# Patient Record
Sex: MALE | Race: WHITE | Employment: OTHER | URBAN - METROPOLITAN AREA
[De-identification: names, ages, dates, MRNs, and addresses within clinical notes are randomized per-mention and may not be internally consistent; named-entity substitution may affect disease eponyms.]

---

## 2017-01-01 ENCOUNTER — APPOINTMENT (OUTPATIENT)
Dept: GENERAL RADIOLOGY | Facility: HOSPITAL | Age: 72
DRG: 190 | End: 2017-01-01
Attending: HOSPITALIST
Payer: MEDICARE

## 2017-01-01 ENCOUNTER — APPOINTMENT (OUTPATIENT)
Dept: CT IMAGING | Facility: HOSPITAL | Age: 72
DRG: 190 | End: 2017-01-01
Attending: HOSPITALIST
Payer: MEDICARE

## 2017-01-01 ENCOUNTER — HOSPITAL ENCOUNTER (INPATIENT)
Facility: HOSPITAL | Age: 72
LOS: 7 days | Discharge: HOSPICE | DRG: 682 | End: 2017-01-01
Attending: EMERGENCY MEDICINE | Admitting: HOSPITALIST
Payer: MEDICARE

## 2017-01-01 ENCOUNTER — APPOINTMENT (OUTPATIENT)
Dept: ULTRASOUND IMAGING | Facility: HOSPITAL | Age: 72
DRG: 682 | End: 2017-01-01
Attending: INTERNAL MEDICINE
Payer: MEDICARE

## 2017-01-01 ENCOUNTER — HOSPITAL ENCOUNTER (INPATIENT)
Facility: HOSPITAL | Age: 72
LOS: 1 days | DRG: 682 | End: 2017-01-01
Attending: HOSPITALIST | Admitting: HOSPITALIST
Payer: OTHER MISCELLANEOUS

## 2017-01-01 ENCOUNTER — TELEPHONE (OUTPATIENT)
Dept: NEUROLOGY | Facility: CLINIC | Age: 72
End: 2017-01-01

## 2017-01-01 ENCOUNTER — HOSPITAL ENCOUNTER (INPATIENT)
Facility: HOSPITAL | Age: 72
LOS: 7 days | Discharge: NURSING, REHAB OR LONG-TERM CARE | DRG: 190 | End: 2017-01-01
Attending: EMERGENCY MEDICINE | Admitting: HOSPITALIST
Payer: MEDICARE

## 2017-01-01 ENCOUNTER — APPOINTMENT (OUTPATIENT)
Dept: GENERAL RADIOLOGY | Facility: HOSPITAL | Age: 72
DRG: 190 | End: 2017-01-01
Attending: EMERGENCY MEDICINE
Payer: MEDICARE

## 2017-01-01 ENCOUNTER — APPOINTMENT (OUTPATIENT)
Dept: GENERAL RADIOLOGY | Facility: HOSPITAL | Age: 72
DRG: 682 | End: 2017-01-01
Attending: EMERGENCY MEDICINE
Payer: MEDICARE

## 2017-01-01 ENCOUNTER — APPOINTMENT (OUTPATIENT)
Dept: GENERAL RADIOLOGY | Facility: HOSPITAL | Age: 72
DRG: 682 | End: 2017-01-01
Attending: HOSPITALIST
Payer: MEDICARE

## 2017-01-01 ENCOUNTER — APPOINTMENT (OUTPATIENT)
Dept: GENERAL RADIOLOGY | Facility: HOSPITAL | Age: 72
DRG: 682 | End: 2017-01-01
Attending: INTERNAL MEDICINE
Payer: MEDICARE

## 2017-01-01 ENCOUNTER — APPOINTMENT (OUTPATIENT)
Dept: GENERAL RADIOLOGY | Facility: HOSPITAL | Age: 72
DRG: 190 | End: 2017-01-01
Attending: ANESTHESIOLOGY
Payer: MEDICARE

## 2017-01-01 ENCOUNTER — APPOINTMENT (OUTPATIENT)
Dept: CV DIAGNOSTICS | Facility: HOSPITAL | Age: 72
DRG: 682 | End: 2017-01-01
Attending: INTERNAL MEDICINE
Payer: MEDICARE

## 2017-01-01 ENCOUNTER — SURGERY (OUTPATIENT)
Age: 72
End: 2017-01-01

## 2017-01-01 ENCOUNTER — APPOINTMENT (OUTPATIENT)
Dept: CV DIAGNOSTICS | Facility: HOSPITAL | Age: 72
DRG: 190 | End: 2017-01-01
Attending: HOSPITALIST
Payer: MEDICARE

## 2017-01-01 ENCOUNTER — APPOINTMENT (OUTPATIENT)
Dept: CT IMAGING | Facility: HOSPITAL | Age: 72
DRG: 682 | End: 2017-01-01
Attending: HOSPITALIST
Payer: MEDICARE

## 2017-01-01 VITALS
TEMPERATURE: 97 F | OXYGEN SATURATION: 94 % | HEART RATE: 70 BPM | BODY MASS INDEX: 42.99 KG/M2 | DIASTOLIC BLOOD PRESSURE: 81 MMHG | RESPIRATION RATE: 20 BRPM | HEIGHT: 68 IN | WEIGHT: 283.63 LBS | SYSTOLIC BLOOD PRESSURE: 151 MMHG

## 2017-01-01 VITALS
HEART RATE: 112 BPM | TEMPERATURE: 103 F | RESPIRATION RATE: 34 BRPM | OXYGEN SATURATION: 90 % | SYSTOLIC BLOOD PRESSURE: 94 MMHG | DIASTOLIC BLOOD PRESSURE: 61 MMHG

## 2017-01-01 VITALS
TEMPERATURE: 98 F | OXYGEN SATURATION: 78 % | HEART RATE: 103 BPM | BODY MASS INDEX: 42.13 KG/M2 | RESPIRATION RATE: 32 BRPM | WEIGHT: 278 LBS | SYSTOLIC BLOOD PRESSURE: 132 MMHG | DIASTOLIC BLOOD PRESSURE: 62 MMHG | HEIGHT: 68 IN

## 2017-01-01 DIAGNOSIS — J96.21 ACUTE ON CHRONIC RESPIRATORY FAILURE WITH HYPOXIA (HCC): ICD-10-CM

## 2017-01-01 DIAGNOSIS — J44.1 COPD EXACERBATION (HCC): ICD-10-CM

## 2017-01-01 DIAGNOSIS — R27.0 ATAXIA: ICD-10-CM

## 2017-01-01 DIAGNOSIS — J44.1 COPD EXACERBATION (HCC): Primary | ICD-10-CM

## 2017-01-01 DIAGNOSIS — R60.0 BILATERAL EDEMA OF LOWER EXTREMITY: ICD-10-CM

## 2017-01-01 DIAGNOSIS — R09.02 HYPOXIA: ICD-10-CM

## 2017-01-01 DIAGNOSIS — I50.9 ACUTE ON CHRONIC CONGESTIVE HEART FAILURE, UNSPECIFIED CONGESTIVE HEART FAILURE TYPE: Primary | ICD-10-CM

## 2017-01-01 LAB
ALBUMIN SERPL BCP-MCNC: 2.9 G/DL (ref 3.5–4.8)
ALBUMIN SERPL ELPH-MCNC: 3.71 G/DL (ref 3.8–5.8)
ALBUMIN/GLOB SERPL: 0.8 {RATIO} (ref 1–2)
ALBUMIN/GLOB SERPL: 1.16 {RATIO} (ref 1–2)
ALP SERPL-CCNC: 59 U/L (ref 32–100)
ALPHA1 GLOB SERPL ELPH-MCNC: 0.22 G/DL (ref 0.1–0.3)
ALPHA2 GLOB SERPL ELPH-MCNC: 0.92 G/DL (ref 0.6–1)
ALT SERPL-CCNC: 24 U/L (ref 17–63)
AMMONIA PLAS-MCNC: 153 UG/DL (ref 28.2–80.4)
AMMONIA PLAS-MCNC: 47 UG/DL (ref 28.2–80.4)
ANION GAP SERPL CALC-SCNC: 6 MMOL/L (ref 0–18)
ANION GAP SERPL CALC-SCNC: 6 MMOL/L (ref 0–18)
ANION GAP SERPL CALC-SCNC: 7 MMOL/L (ref 0–18)
ANION GAP SERPL CALC-SCNC: 8 MMOL/L (ref 0–18)
ANTIBODY SCREEN: NEGATIVE
AST SERPL-CCNC: 36 U/L (ref 15–41)
B-GLOBULIN SERPL ELPH-MCNC: 1.04 G/DL (ref 0.7–1.3)
BACTERIA UR QL AUTO: NEGATIVE /HPF
BACTERIA UR QL AUTO: NEGATIVE /HPF
BASOPHILS # BLD: 0 K/UL (ref 0–0.2)
BASOPHILS NFR BLD: 0 %
BILIRUB SERPL-MCNC: 0.7 MG/DL (ref 0.3–1.2)
BILIRUB UR QL: NEGATIVE
BILIRUB UR QL: NEGATIVE
BLOOD TYPE BARCODE: 1700
BNP SERPL-MCNC: 43 PG/ML (ref 0–100)
BUN SERPL-MCNC: 23 MG/DL (ref 8–20)
BUN SERPL-MCNC: 25 MG/DL (ref 8–20)
BUN SERPL-MCNC: 25 MG/DL (ref 8–20)
BUN SERPL-MCNC: 26 MG/DL (ref 8–20)
BUN SERPL-MCNC: 27 MG/DL (ref 8–20)
BUN SERPL-MCNC: 28 MG/DL (ref 8–20)
BUN SERPL-MCNC: 31 MG/DL (ref 8–20)
BUN/CREAT SERPL: 22.3 (ref 10–20)
BUN/CREAT SERPL: 26.3 (ref 10–20)
BUN/CREAT SERPL: 28.1 (ref 10–20)
BUN/CREAT SERPL: 29.3 (ref 10–20)
BUN/CREAT SERPL: 31.1 (ref 10–20)
BUN/CREAT SERPL: 38.2 (ref 10–20)
BUN/CREAT SERPL: 41.9 (ref 10–20)
CALCIUM SERPL-MCNC: 8.8 MG/DL (ref 8.5–10.5)
CALCIUM SERPL-MCNC: 9 MG/DL (ref 8.5–10.5)
CALCIUM SERPL-MCNC: 9.1 MG/DL (ref 8.5–10.5)
CALCIUM SERPL-MCNC: 9.1 MG/DL (ref 8.5–10.5)
CALCIUM SERPL-MCNC: 9.2 MG/DL (ref 8.5–10.5)
CALCIUM SERPL-MCNC: 9.3 MG/DL (ref 8.5–10.5)
CALCIUM SERPL-MCNC: 9.8 MG/DL (ref 8.5–10.5)
CHLORIDE SERPL-SCNC: 100 MMOL/L (ref 95–110)
CHLORIDE SERPL-SCNC: 103 MMOL/L (ref 95–110)
CHLORIDE SERPL-SCNC: 103 MMOL/L (ref 95–110)
CHLORIDE SERPL-SCNC: 97 MMOL/L (ref 95–110)
CHLORIDE SERPL-SCNC: 98 MMOL/L (ref 95–110)
CHLORIDE SERPL-SCNC: 99 MMOL/L (ref 95–110)
CHLORIDE SERPL-SCNC: 99 MMOL/L (ref 95–110)
CLARITY UR: CLEAR
CLARITY UR: CLEAR
CO2 SERPL-SCNC: 34 MMOL/L (ref 22–32)
CO2 SERPL-SCNC: 35 MMOL/L (ref 22–32)
CO2 SERPL-SCNC: 36 MMOL/L (ref 22–32)
CO2 SERPL-SCNC: 38 MMOL/L (ref 22–32)
CO2 SERPL-SCNC: 39 MMOL/L (ref 22–32)
COLOR UR: YELLOW
COLOR UR: YELLOW
CREAT SERPL-MCNC: 0.68 MG/DL (ref 0.5–1.5)
CREAT SERPL-MCNC: 0.74 MG/DL (ref 0.5–1.5)
CREAT SERPL-MCNC: 0.89 MG/DL (ref 0.5–1.5)
CREAT SERPL-MCNC: 0.9 MG/DL (ref 0.5–1.5)
CREAT SERPL-MCNC: 0.92 MG/DL (ref 0.5–1.5)
CREAT SERPL-MCNC: 0.95 MG/DL (ref 0.5–1.5)
CREAT SERPL-MCNC: 1.03 MG/DL (ref 0.5–1.5)
EOSINOPHIL # BLD: 0 K/UL (ref 0–0.7)
EOSINOPHIL # BLD: 0.1 K/UL (ref 0–0.7)
EOSINOPHIL NFR BLD: 0 %
EOSINOPHIL NFR BLD: 1 %
ERYTHROCYTE [DISTWIDTH] IN BLOOD BY AUTOMATED COUNT: 15 % (ref 11–15)
ERYTHROCYTE [DISTWIDTH] IN BLOOD BY AUTOMATED COUNT: 15 % (ref 11–15)
ERYTHROCYTE [DISTWIDTH] IN BLOOD BY AUTOMATED COUNT: 15.1 % (ref 11–15)
ERYTHROCYTE [DISTWIDTH] IN BLOOD BY AUTOMATED COUNT: 15.4 % (ref 11–15)
ERYTHROCYTE [DISTWIDTH] IN BLOOD BY AUTOMATED COUNT: 15.5 % (ref 11–15)
ERYTHROCYTE [DISTWIDTH] IN BLOOD BY AUTOMATED COUNT: 15.5 % (ref 11–15)
ERYTHROCYTE [DISTWIDTH] IN BLOOD BY AUTOMATED COUNT: 15.9 % (ref 11–15)
ERYTHROCYTE [SEDIMENTATION RATE] IN BLOOD: 6 MM/HR (ref 0–20)
GAMMA GLOB SERPL ELPH-MCNC: 1.02 G/DL (ref 0.5–1.7)
GLOBULIN PLAS-MCNC: 3.5 G/DL (ref 2.5–3.7)
GLUCOSE SERPL-MCNC: 106 MG/DL (ref 70–99)
GLUCOSE SERPL-MCNC: 112 MG/DL (ref 70–99)
GLUCOSE SERPL-MCNC: 121 MG/DL (ref 70–99)
GLUCOSE SERPL-MCNC: 126 MG/DL (ref 70–99)
GLUCOSE SERPL-MCNC: 128 MG/DL (ref 70–99)
GLUCOSE SERPL-MCNC: 91 MG/DL (ref 70–99)
GLUCOSE SERPL-MCNC: 99 MG/DL (ref 70–99)
GLUCOSE UR-MCNC: NEGATIVE MG/DL
GLUCOSE UR-MCNC: NEGATIVE MG/DL
HCT VFR BLD AUTO: 47.9 % (ref 41–52)
HCT VFR BLD AUTO: 48 % (ref 41–52)
HCT VFR BLD AUTO: 48.4 % (ref 41–52)
HCT VFR BLD AUTO: 48.8 % (ref 41–52)
HCT VFR BLD AUTO: 50.4 % (ref 41–52)
HCT VFR BLD AUTO: 51 % (ref 41–52)
HCT VFR BLD AUTO: 52.9 % (ref 41–52)
HGB BLD-MCNC: 15.5 G/DL (ref 13.5–17.5)
HGB BLD-MCNC: 15.8 G/DL (ref 13.5–17.5)
HGB BLD-MCNC: 15.9 G/DL (ref 13.5–17.5)
HGB BLD-MCNC: 16 G/DL (ref 13.5–17.5)
HGB BLD-MCNC: 16.2 G/DL (ref 13.5–17.5)
HGB BLD-MCNC: 16.6 G/DL (ref 13.5–17.5)
HGB BLD-MCNC: 17.2 G/DL (ref 13.5–17.5)
HGB UR QL STRIP.AUTO: NEGATIVE
HGB UR QL STRIP.AUTO: NEGATIVE
LEUKOCYTE ESTERASE UR QL STRIP.AUTO: NEGATIVE
LEUKOCYTE ESTERASE UR QL STRIP.AUTO: NEGATIVE
LYMPHOCYTES # BLD: 0.8 K/UL (ref 1–4)
LYMPHOCYTES # BLD: 0.9 K/UL (ref 1–4)
LYMPHOCYTES # BLD: 1.4 K/UL (ref 1–4)
LYMPHOCYTES # BLD: 1.7 K/UL (ref 1–4)
LYMPHOCYTES # BLD: 1.7 K/UL (ref 1–4)
LYMPHOCYTES # BLD: 2.6 K/UL (ref 1–4)
LYMPHOCYTES # BLD: 2.8 K/UL (ref 1–4)
LYMPHOCYTES NFR BLD: 11 %
LYMPHOCYTES NFR BLD: 23 %
LYMPHOCYTES NFR BLD: 23 %
LYMPHOCYTES NFR BLD: 27 %
LYMPHOCYTES NFR BLD: 33 %
LYMPHOCYTES NFR BLD: 34 %
LYMPHOCYTES NFR BLD: 8 %
MAGNESIUM SERPL-MCNC: 1.6 MG/DL (ref 1.8–2.5)
MAGNESIUM SERPL-MCNC: 1.8 MG/DL (ref 1.8–2.5)
MAGNESIUM SERPL-MCNC: 1.9 MG/DL (ref 1.8–2.5)
MCH RBC QN AUTO: 28 PG (ref 27–32)
MCH RBC QN AUTO: 28.1 PG (ref 27–32)
MCH RBC QN AUTO: 28.2 PG (ref 27–32)
MCH RBC QN AUTO: 28.2 PG (ref 27–32)
MCH RBC QN AUTO: 28.3 PG (ref 27–32)
MCH RBC QN AUTO: 28.5 PG (ref 27–32)
MCH RBC QN AUTO: 28.6 PG (ref 27–32)
MCHC RBC AUTO-ENTMCNC: 32.2 G/DL (ref 32–37)
MCHC RBC AUTO-ENTMCNC: 32.3 G/DL (ref 32–37)
MCHC RBC AUTO-ENTMCNC: 32.5 G/DL (ref 32–37)
MCHC RBC AUTO-ENTMCNC: 32.5 G/DL (ref 32–37)
MCHC RBC AUTO-ENTMCNC: 32.6 G/DL (ref 32–37)
MCHC RBC AUTO-ENTMCNC: 32.8 G/DL (ref 32–37)
MCHC RBC AUTO-ENTMCNC: 33.1 G/DL (ref 32–37)
MCV RBC AUTO: 86.1 FL (ref 80–100)
MCV RBC AUTO: 86.4 FL (ref 80–100)
MCV RBC AUTO: 86.8 FL (ref 80–100)
MCV RBC AUTO: 87 FL (ref 80–100)
MCV RBC AUTO: 87 FL (ref 80–100)
MCV RBC AUTO: 87.2 FL (ref 80–100)
MCV RBC AUTO: 87.2 FL (ref 80–100)
METAMYELOCYTES # BLD MANUAL: 0.06 K/UL
METAMYELOCYTES # BLD MANUAL: 0.06 K/UL
METAMYELOCYTES NFR BLD: 1 %
MONOCYTES # BLD: 0.6 K/UL (ref 0–1)
MONOCYTES # BLD: 0.7 K/UL (ref 0–1)
MONOCYTES # BLD: 0.9 K/UL (ref 0–1)
MONOCYTES # BLD: 1.2 K/UL (ref 0–1)
MONOCYTES # BLD: 1.3 K/UL (ref 0–1)
MONOCYTES NFR BLD: 11 %
MONOCYTES NFR BLD: 12 %
MONOCYTES NFR BLD: 15 %
MONOCYTES NFR BLD: 19 %
MONOCYTES NFR BLD: 6 %
MONOCYTES NFR BLD: 8 %
MONOCYTES NFR BLD: 9 %
MYELOCYTES NFR BLD: 1 %
NEUTROPHILS # BLD AUTO: 3.3 K/UL (ref 1.8–7.7)
NEUTROPHILS # BLD AUTO: 3.8 K/UL (ref 1.8–7.7)
NEUTROPHILS # BLD AUTO: 4.4 K/UL (ref 1.8–7.7)
NEUTROPHILS # BLD AUTO: 4.8 K/UL (ref 1.8–7.7)
NEUTROPHILS # BLD AUTO: 5 K/UL (ref 1.8–7.7)
NEUTROPHILS # BLD AUTO: 6.1 K/UL (ref 1.8–7.7)
NEUTROPHILS # BLD AUTO: 8.1 K/UL (ref 1.8–7.7)
NEUTROPHILS NFR BLD: 51 %
NEUTROPHILS NFR BLD: 54 %
NEUTROPHILS NFR BLD: 58 %
NEUTROPHILS NFR BLD: 61 %
NEUTROPHILS NFR BLD: 68 %
NEUTROPHILS NFR BLD: 74 %
NEUTROPHILS NFR BLD: 83 %
NEUTS BAND NFR BLD: 1 %
NEUTS BAND NFR BLD: 3 %
NEUTS BAND NFR BLD: 4 %
NITRITE UR QL STRIP.AUTO: NEGATIVE
NITRITE UR QL STRIP.AUTO: NEGATIVE
OSMOLALITY UR CALC.SUM OF ELEC: 299 MOSM/KG (ref 275–295)
OSMOLALITY UR CALC.SUM OF ELEC: 300 MOSM/KG (ref 275–295)
OSMOLALITY UR CALC.SUM OF ELEC: 301 MOSM/KG (ref 275–295)
OSMOLALITY UR CALC.SUM OF ELEC: 303 MOSM/KG (ref 275–295)
OSMOLALITY UR CALC.SUM OF ELEC: 303 MOSM/KG (ref 275–295)
OSMOLALITY UR CALC.SUM OF ELEC: 304 MOSM/KG (ref 275–295)
OSMOLALITY UR CALC.SUM OF ELEC: 308 MOSM/KG (ref 275–295)
PH UR: 5 [PH] (ref 5–8)
PH UR: 5 [PH] (ref 5–8)
PLATELET # BLD AUTO: 103 K/UL (ref 140–400)
PLATELET # BLD AUTO: 107 K/UL (ref 140–400)
PLATELET # BLD AUTO: 111 K/UL (ref 140–400)
PLATELET # BLD AUTO: 117 K/UL (ref 140–400)
PLATELET # BLD AUTO: 117 K/UL (ref 140–400)
PLATELET # BLD AUTO: 82 K/UL (ref 140–400)
PLATELET # BLD AUTO: 95 K/UL (ref 140–400)
PMV BLD AUTO: 8.8 FL (ref 7.4–10.3)
PMV BLD AUTO: 9 FL (ref 7.4–10.3)
PMV BLD AUTO: 9.2 FL (ref 7.4–10.3)
PMV BLD AUTO: 9.2 FL (ref 7.4–10.3)
PMV BLD AUTO: 9.4 FL (ref 7.4–10.3)
PMV BLD AUTO: 9.5 FL (ref 7.4–10.3)
PMV BLD AUTO: 9.6 FL (ref 7.4–10.3)
POTASSIUM SERPL-SCNC: 3.7 MMOL/L (ref 3.3–5.1)
POTASSIUM SERPL-SCNC: 4 MMOL/L (ref 3.3–5.1)
POTASSIUM SERPL-SCNC: 4.3 MMOL/L (ref 3.3–5.1)
POTASSIUM SERPL-SCNC: 4.4 MMOL/L (ref 3.3–5.1)
POTASSIUM SERPL-SCNC: 4.4 MMOL/L (ref 3.3–5.1)
POTASSIUM SERPL-SCNC: 4.5 MMOL/L (ref 3.3–5.1)
POTASSIUM SERPL-SCNC: 4.7 MMOL/L (ref 3.3–5.1)
POTASSIUM SERPL-SCNC: 5 MMOL/L (ref 3.3–5.1)
PROT SERPL-MCNC: 6.4 G/DL (ref 5.9–8.4)
PROT UR-MCNC: NEGATIVE MG/DL
PROT UR-MCNC: NEGATIVE MG/DL
RBC # BLD AUTO: 5.5 M/UL (ref 4.5–5.9)
RBC # BLD AUTO: 5.57 M/UL (ref 4.5–5.9)
RBC # BLD AUTO: 5.59 M/UL (ref 4.5–5.9)
RBC # BLD AUTO: 5.6 M/UL (ref 4.5–5.9)
RBC # BLD AUTO: 5.8 M/UL (ref 4.5–5.9)
RBC # BLD AUTO: 5.87 M/UL (ref 4.5–5.9)
RBC # BLD AUTO: 6.06 M/UL (ref 4.5–5.9)
RBC #/AREA URNS AUTO: 1 /HPF
RBC #/AREA URNS AUTO: <1 /HPF
RH BLOOD TYPE: NEGATIVE
SODIUM SERPL-SCNC: 141 MMOL/L (ref 136–144)
SODIUM SERPL-SCNC: 143 MMOL/L (ref 136–144)
SODIUM SERPL-SCNC: 144 MMOL/L (ref 136–144)
SODIUM SERPL-SCNC: 147 MMOL/L (ref 136–144)
SP GR UR STRIP: 1.02 (ref 1–1.03)
SP GR UR STRIP: 1.02 (ref 1–1.03)
TOTAL PROTEIN (SPECIAL TESTING): 6.9 G/DL (ref 6.5–9.1)
TROPONIN I SERPL-MCNC: 0.01 NG/ML (ref ?–0.03)
TSH SERPL-ACNC: 0.51 UIU/ML (ref 0.34–5.6)
UROBILINOGEN UR STRIP-ACNC: <2
UROBILINOGEN UR STRIP-ACNC: <2
VALPROATE SERPL-MCNC: 68 MCG/ML (ref 50–100)
VIT B12 SERPL-MCNC: 517 PG/ML (ref 181–914)
VIT C UR-MCNC: 40 MG/DL
VIT C UR-MCNC: 40 MG/DL
WBC # BLD AUTO: 6.1 K/UL (ref 4–11)
WBC # BLD AUTO: 6.2 K/UL (ref 4–11)
WBC # BLD AUTO: 7.4 K/UL (ref 4–11)
WBC # BLD AUTO: 7.9 K/UL (ref 4–11)
WBC # BLD AUTO: 8.3 K/UL (ref 4–11)
WBC # BLD AUTO: 8.3 K/UL (ref 4–11)
WBC # BLD AUTO: 9.4 K/UL (ref 4–11)
WBC #/AREA URNS AUTO: 1 /HPF
WBC #/AREA URNS AUTO: 1 /HPF

## 2017-01-01 PROCEDURE — 99233 SBSQ HOSP IP/OBS HIGH 50: CPT | Performed by: HOSPITALIST

## 2017-01-01 PROCEDURE — 99238 HOSP IP/OBS DSCHRG MGMT 30/<: CPT | Performed by: HOSPITALIST

## 2017-01-01 PROCEDURE — 99232 SBSQ HOSP IP/OBS MODERATE 35: CPT | Performed by: INTERNAL MEDICINE

## 2017-01-01 PROCEDURE — 94640 AIRWAY INHALATION TREATMENT: CPT

## 2017-01-01 PROCEDURE — 71010 XR CHEST AP PORTABLE  (CPT=71010): CPT

## 2017-01-01 PROCEDURE — 99233 SBSQ HOSP IP/OBS HIGH 50: CPT | Performed by: INTERNAL MEDICINE

## 2017-01-01 PROCEDURE — 93306 TTE W/DOPPLER COMPLETE: CPT

## 2017-01-01 PROCEDURE — 99233 SBSQ HOSP IP/OBS HIGH 50: CPT | Performed by: REGISTERED NURSE

## 2017-01-01 PROCEDURE — 99232 SBSQ HOSP IP/OBS MODERATE 35: CPT | Performed by: OTHER

## 2017-01-01 PROCEDURE — 70450 CT HEAD/BRAIN W/O DYE: CPT

## 2017-01-01 PROCEDURE — 99233 SBSQ HOSP IP/OBS HIGH 50: CPT | Performed by: OTHER

## 2017-01-01 PROCEDURE — 71250 CT THORAX DX C-: CPT

## 2017-01-01 PROCEDURE — 3E0S33Z INTRODUCTION OF ANTI-INFLAMMATORY INTO EPIDURAL SPACE, PERCUTANEOUS APPROACH: ICD-10-PCS | Performed by: ANESTHESIOLOGY

## 2017-01-01 PROCEDURE — 99223 1ST HOSP IP/OBS HIGH 75: CPT | Performed by: INTERNAL MEDICINE

## 2017-01-01 PROCEDURE — 99239 HOSP IP/OBS DSCHRG MGMT >30: CPT | Performed by: HOSPITALIST

## 2017-01-01 PROCEDURE — 77003 FLUOROGUIDE FOR SPINE INJECT: CPT

## 2017-01-01 PROCEDURE — 93306 TTE W/DOPPLER COMPLETE: CPT | Performed by: INTERNAL MEDICINE

## 2017-01-01 PROCEDURE — 99223 1ST HOSP IP/OBS HIGH 75: CPT | Performed by: HOSPITALIST

## 2017-01-01 PROCEDURE — 93970 EXTREMITY STUDY: CPT

## 2017-01-01 PROCEDURE — 76775 US EXAM ABDO BACK WALL LIM: CPT

## 2017-01-01 PROCEDURE — 72131 CT LUMBAR SPINE W/O DYE: CPT

## 2017-01-01 PROCEDURE — 99223 1ST HOSP IP/OBS HIGH 75: CPT | Performed by: REGISTERED NURSE

## 2017-01-01 PROCEDURE — 99232 SBSQ HOSP IP/OBS MODERATE 35: CPT | Performed by: REGISTERED NURSE

## 2017-01-01 PROCEDURE — 99232 SBSQ HOSP IP/OBS MODERATE 35: CPT | Performed by: HOSPITALIST

## 2017-01-01 PROCEDURE — 71010 XR CHEST AP/PA (1 VIEW) (CPT=71010): CPT

## 2017-01-01 PROCEDURE — 90792 PSYCH DIAG EVAL W/MED SRVCS: CPT | Performed by: OTHER

## 2017-01-01 RX ORDER — ZOLPIDEM TARTRATE 5 MG/1
2.5 TABLET ORAL NIGHTLY PRN
Status: CANCELLED | OUTPATIENT
Start: 2017-01-01

## 2017-01-01 RX ORDER — ATROPINE SULFATE 10 MG/ML
2 SOLUTION/ DROPS OPHTHALMIC EVERY 2 HOUR PRN
Status: CANCELLED | OUTPATIENT
Start: 2017-01-01

## 2017-01-01 RX ORDER — DIVALPROEX SODIUM 500 MG/1
1500 TABLET, EXTENDED RELEASE ORAL
Status: DISCONTINUED | OUTPATIENT
Start: 2017-01-01 | End: 2017-01-01

## 2017-01-01 RX ORDER — METHYLPREDNISOLONE ACETATE 80 MG/ML
INJECTION, SUSPENSION INTRA-ARTICULAR; INTRALESIONAL; INTRAMUSCULAR; SOFT TISSUE AS NEEDED
Status: DISCONTINUED | OUTPATIENT
Start: 2017-01-01 | End: 2017-01-01 | Stop reason: HOSPADM

## 2017-01-01 RX ORDER — DOCUSATE SODIUM 100 MG/1
100 CAPSULE, LIQUID FILLED ORAL 2 TIMES DAILY
Status: ON HOLD | COMMUNITY
End: 2017-01-01

## 2017-01-01 RX ORDER — MORPHINE SULFATE IN 0.9 % NACL 1 MG/ML
1 PLASTIC BAG, INJECTION (ML) INTRAVENOUS CONTINUOUS PRN
Status: DISCONTINUED | OUTPATIENT
Start: 2017-01-01 | End: 2017-01-01

## 2017-01-01 RX ORDER — ONDANSETRON 2 MG/ML
4 INJECTION INTRAMUSCULAR; INTRAVENOUS EVERY 6 HOURS PRN
Status: CANCELLED | OUTPATIENT
Start: 2017-01-01

## 2017-01-01 RX ORDER — SCOLOPAMINE TRANSDERMAL SYSTEM 1 MG/1
1 PATCH, EXTENDED RELEASE TRANSDERMAL
Status: CANCELLED | OUTPATIENT
Start: 2017-04-06

## 2017-01-01 RX ORDER — SENNA AND DOCUSATE SODIUM 50; 8.6 MG/1; MG/1
2 TABLET, FILM COATED ORAL
Status: DISCONTINUED | OUTPATIENT
Start: 2017-01-01 | End: 2017-01-01

## 2017-01-01 RX ORDER — DIVALPROEX SODIUM 250 MG/1
500 TABLET, DELAYED RELEASE ORAL DAILY
Status: DISCONTINUED | OUTPATIENT
Start: 2017-01-01 | End: 2017-01-01 | Stop reason: CLARIF

## 2017-01-01 RX ORDER — GLYCOPYRROLATE 0.2 MG/ML
0.4 INJECTION, SOLUTION INTRAMUSCULAR; INTRAVENOUS
Status: DISCONTINUED | OUTPATIENT
Start: 2017-01-01 | End: 2017-01-01

## 2017-01-01 RX ORDER — 0.9 % SODIUM CHLORIDE 0.9 %
VIAL (ML) INJECTION
Status: COMPLETED
Start: 2017-01-01 | End: 2017-01-01

## 2017-01-01 RX ORDER — ACETAMINOPHEN 160 MG/5ML
500 SOLUTION ORAL EVERY 6 HOURS PRN
Status: ON HOLD | COMMUNITY
End: 2017-01-01

## 2017-01-01 RX ORDER — HYDROCODONE BITARTRATE AND ACETAMINOPHEN 5; 325 MG/1; MG/1
2 TABLET ORAL EVERY 4 HOURS PRN
Status: DISCONTINUED | OUTPATIENT
Start: 2017-01-01 | End: 2017-01-01

## 2017-01-01 RX ORDER — DIVALPROEX SODIUM 250 MG/1
500 TABLET, DELAYED RELEASE ORAL NIGHTLY
Status: DISCONTINUED | OUTPATIENT
Start: 2017-01-01 | End: 2017-01-01

## 2017-01-01 RX ORDER — DIVALPROEX SODIUM 500 MG/1
500 TABLET, DELAYED RELEASE ORAL NIGHTLY
Status: ON HOLD | COMMUNITY
End: 2017-01-01

## 2017-01-01 RX ORDER — SODIUM CHLORIDE 0.9 % (FLUSH) 0.9 %
SYRINGE (ML) INJECTION
Status: COMPLETED
Start: 2017-01-01 | End: 2017-01-01

## 2017-01-01 RX ORDER — DOXEPIN HYDROCHLORIDE 50 MG/1
1 CAPSULE ORAL DAILY
Status: DISCONTINUED | OUTPATIENT
Start: 2017-01-01 | End: 2017-01-01

## 2017-01-01 RX ORDER — BISACODYL 10 MG
10 SUPPOSITORY, RECTAL RECTAL
Status: CANCELLED | OUTPATIENT
Start: 2017-01-01

## 2017-01-01 RX ORDER — POLYETHYLENE GLYCOL 3350 17 G/17G
17 POWDER, FOR SOLUTION ORAL DAILY PRN
Status: DISCONTINUED | OUTPATIENT
Start: 2017-01-01 | End: 2017-01-01

## 2017-01-01 RX ORDER — CLONAZEPAM 1 MG/1
2 TABLET ORAL 2 TIMES DAILY
Status: DISCONTINUED | OUTPATIENT
Start: 2017-01-01 | End: 2017-01-01

## 2017-01-01 RX ORDER — BISACODYL 10 MG
10 SUPPOSITORY, RECTAL RECTAL
Status: DISCONTINUED | OUTPATIENT
Start: 2017-01-01 | End: 2017-01-01

## 2017-01-01 RX ORDER — CASTOR OIL AND BALSAM, PERU 788; 87 MG/G; MG/G
OINTMENT TOPICAL 2 TIMES DAILY PRN
Status: CANCELLED | OUTPATIENT
Start: 2017-01-01

## 2017-01-01 RX ORDER — CLOTRIMAZOLE AND BETAMETHASONE DIPROPIONATE 10; .64 MG/G; MG/G
1 CREAM TOPICAL AS NEEDED
Status: DISCONTINUED | OUTPATIENT
Start: 2017-01-01 | End: 2017-01-01

## 2017-01-01 RX ORDER — LIDOCAINE HYDROCHLORIDE 10 MG/ML
INJECTION, SOLUTION EPIDURAL; INFILTRATION; INTRACAUDAL; PERINEURAL AS NEEDED
Status: DISCONTINUED | OUTPATIENT
Start: 2017-01-01 | End: 2017-01-01 | Stop reason: HOSPADM

## 2017-01-01 RX ORDER — CLONAZEPAM 1 MG/1
1 TABLET ORAL 3 TIMES DAILY
Status: DISCONTINUED | OUTPATIENT
Start: 2017-01-01 | End: 2017-01-01

## 2017-01-01 RX ORDER — FINASTERIDE 5 MG/1
5 TABLET, FILM COATED ORAL DAILY
Status: DISCONTINUED | OUTPATIENT
Start: 2017-01-01 | End: 2017-01-01

## 2017-01-01 RX ORDER — MORPHINE SULFATE 2 MG/ML
2 INJECTION, SOLUTION INTRAMUSCULAR; INTRAVENOUS EVERY 4 HOURS PRN
Status: DISCONTINUED | OUTPATIENT
Start: 2017-01-01 | End: 2017-01-01

## 2017-01-01 RX ORDER — DIVALPROEX SODIUM 250 MG/1
250 TABLET, DELAYED RELEASE ORAL NIGHTLY
Status: DISCONTINUED | OUTPATIENT
Start: 2017-01-01 | End: 2017-01-01

## 2017-01-01 RX ORDER — POTASSIUM CHLORIDE 750 MG/1
10 TABLET, EXTENDED RELEASE ORAL DAILY
Status: ON HOLD | COMMUNITY
End: 2017-01-01

## 2017-01-01 RX ORDER — DOCUSATE SODIUM 100 MG/1
100 CAPSULE, LIQUID FILLED ORAL 2 TIMES DAILY
Status: DISCONTINUED | OUTPATIENT
Start: 2017-01-01 | End: 2017-01-01

## 2017-01-01 RX ORDER — MORPHINE SULFATE 2 MG/ML
INJECTION, SOLUTION INTRAMUSCULAR; INTRAVENOUS
Status: COMPLETED
Start: 2017-01-01 | End: 2017-01-01

## 2017-01-01 RX ORDER — FUROSEMIDE 10 MG/ML
40 INJECTION INTRAMUSCULAR; INTRAVENOUS EVERY 8 HOURS PRN
Status: DISCONTINUED | OUTPATIENT
Start: 2017-01-01 | End: 2017-01-01

## 2017-01-01 RX ORDER — SODIUM CHLORIDE 9 MG/ML
INJECTION, SOLUTION INTRAVENOUS CONTINUOUS
Status: DISCONTINUED | OUTPATIENT
Start: 2017-01-01 | End: 2017-01-01

## 2017-01-01 RX ORDER — VALPROIC ACID 250 MG/1
1250 CAPSULE, LIQUID FILLED ORAL NIGHTLY
Status: DISCONTINUED | OUTPATIENT
Start: 2017-01-01 | End: 2017-01-01

## 2017-01-01 RX ORDER — ASPIRIN 81 MG/1
81 TABLET ORAL DAILY
Status: ON HOLD | COMMUNITY
End: 2017-01-01

## 2017-01-01 RX ORDER — LORAZEPAM 2 MG/ML
1 INJECTION INTRAMUSCULAR EVERY 4 HOURS PRN
Status: DISCONTINUED | OUTPATIENT
Start: 2017-01-01 | End: 2017-01-01

## 2017-01-01 RX ORDER — ONDANSETRON 2 MG/ML
4 INJECTION INTRAMUSCULAR; INTRAVENOUS EVERY 4 HOURS PRN
Status: DISCONTINUED | OUTPATIENT
Start: 2017-01-01 | End: 2017-01-01

## 2017-01-01 RX ORDER — METHYLPREDNISOLONE SODIUM SUCCINATE 40 MG/ML
32 INJECTION, POWDER, LYOPHILIZED, FOR SOLUTION INTRAMUSCULAR; INTRAVENOUS EVERY 8 HOURS
Status: DISCONTINUED | OUTPATIENT
Start: 2017-01-01 | End: 2017-01-01

## 2017-01-01 RX ORDER — ATORVASTATIN CALCIUM 10 MG/1
10 TABLET, FILM COATED ORAL NIGHTLY
Status: ON HOLD | COMMUNITY
End: 2017-01-01

## 2017-01-01 RX ORDER — ONDANSETRON 2 MG/ML
4 INJECTION INTRAMUSCULAR; INTRAVENOUS EVERY 4 HOURS PRN
Status: CANCELLED | OUTPATIENT
Start: 2017-01-01

## 2017-01-01 RX ORDER — MORPHINE SULFATE 2 MG/ML
2 INJECTION, SOLUTION INTRAMUSCULAR; INTRAVENOUS ONCE
Status: DISCONTINUED | OUTPATIENT
Start: 2017-01-01 | End: 2017-01-01

## 2017-01-01 RX ORDER — FUROSEMIDE 10 MG/ML
40 INJECTION INTRAMUSCULAR; INTRAVENOUS ONCE
Status: COMPLETED | OUTPATIENT
Start: 2017-01-01 | End: 2017-01-01

## 2017-01-01 RX ORDER — OLANZAPINE 2.5 MG/1
2.5 TABLET ORAL NIGHTLY
Status: DISCONTINUED | OUTPATIENT
Start: 2017-01-01 | End: 2017-01-01

## 2017-01-01 RX ORDER — VALPROIC ACID 250 MG/1
500 CAPSULE, LIQUID FILLED ORAL DAILY
Status: DISCONTINUED | OUTPATIENT
Start: 2017-01-01 | End: 2017-01-01

## 2017-01-01 RX ORDER — OLANZAPINE 20 MG/1
10 TABLET ORAL NIGHTLY
Status: ON HOLD | COMMUNITY
End: 2017-01-01

## 2017-01-01 RX ORDER — ZOLPIDEM TARTRATE 5 MG/1
2.5 TABLET ORAL NIGHTLY PRN
Status: DISCONTINUED | OUTPATIENT
Start: 2017-01-01 | End: 2017-01-01

## 2017-01-01 RX ORDER — HEPARIN SODIUM AND DEXTROSE 10000; 5 [USP'U]/100ML; G/100ML
18 INJECTION INTRAVENOUS ONCE
Status: DISCONTINUED | OUTPATIENT
Start: 2017-01-01 | End: 2017-01-01

## 2017-01-01 RX ORDER — DIVALPROEX SODIUM 500 MG/1
1000 TABLET, EXTENDED RELEASE ORAL NIGHTLY
Qty: 30 TABLET | Refills: 0 | Status: SHIPPED | OUTPATIENT
Start: 2017-01-01 | End: 2017-01-01

## 2017-01-01 RX ORDER — CLONAZEPAM 0.5 MG/1
0.5 TABLET ORAL 3 TIMES DAILY
Status: DISCONTINUED | OUTPATIENT
Start: 2017-01-01 | End: 2017-01-01

## 2017-01-01 RX ORDER — DIVALPROEX SODIUM 500 MG/1
1500 TABLET, EXTENDED RELEASE ORAL NIGHTLY
Status: DISCONTINUED | OUTPATIENT
Start: 2017-01-01 | End: 2017-01-01

## 2017-01-01 RX ORDER — DIVALPROEX SODIUM 500 MG/1
1000 TABLET, EXTENDED RELEASE ORAL NIGHTLY
Status: DISCONTINUED | OUTPATIENT
Start: 2017-01-01 | End: 2017-01-01

## 2017-01-01 RX ORDER — BISACODYL 10 MG
10 SUPPOSITORY, RECTAL RECTAL
Status: ON HOLD | COMMUNITY
End: 2017-01-01

## 2017-01-01 RX ORDER — MORPHINE SULFATE 2 MG/ML
1 INJECTION, SOLUTION INTRAMUSCULAR; INTRAVENOUS EVERY 2 HOUR PRN
Status: DISCONTINUED | OUTPATIENT
Start: 2017-01-01 | End: 2017-01-01

## 2017-01-01 RX ORDER — ONDANSETRON 2 MG/ML
4 INJECTION INTRAMUSCULAR; INTRAVENOUS EVERY 6 HOURS PRN
Status: DISCONTINUED | OUTPATIENT
Start: 2017-01-01 | End: 2017-01-01

## 2017-01-01 RX ORDER — METHYLPREDNISOLONE SODIUM SUCCINATE 40 MG/ML
40 INJECTION, POWDER, LYOPHILIZED, FOR SOLUTION INTRAMUSCULAR; INTRAVENOUS EVERY 8 HOURS
Status: DISCONTINUED | OUTPATIENT
Start: 2017-01-01 | End: 2017-01-01

## 2017-01-01 RX ORDER — SODIUM CHLORIDE 9 MG/ML
INJECTION, SOLUTION INTRAVENOUS ONCE
Status: COMPLETED | OUTPATIENT
Start: 2017-01-01 | End: 2017-01-01

## 2017-01-01 RX ORDER — LACTULOSE 10 G/15ML
20 SOLUTION ORAL DAILY
Status: DISCONTINUED | OUTPATIENT
Start: 2017-01-01 | End: 2017-01-01

## 2017-01-01 RX ORDER — SODIUM CHLORIDE 0.9 % (FLUSH) 0.9 %
10 SYRINGE (ML) INJECTION AS NEEDED
Status: DISCONTINUED | OUTPATIENT
Start: 2017-01-01 | End: 2017-01-01

## 2017-01-01 RX ORDER — FUROSEMIDE 40 MG/1
40 TABLET ORAL EVERY 8 HOURS PRN
Status: DISCONTINUED | OUTPATIENT
Start: 2017-01-01 | End: 2017-01-01

## 2017-01-01 RX ORDER — SODIUM CHLORIDE 9 MG/ML
INJECTION, SOLUTION INTRAVENOUS
Status: COMPLETED
Start: 2017-01-01 | End: 2017-01-01

## 2017-01-01 RX ORDER — DIVALPROEX SODIUM 500 MG/1
1000 TABLET, EXTENDED RELEASE ORAL
Status: COMPLETED | OUTPATIENT
Start: 2017-01-01 | End: 2017-01-01

## 2017-01-01 RX ORDER — DOCUSATE SODIUM 100 MG/1
100 CAPSULE, LIQUID FILLED ORAL 2 TIMES DAILY
Status: CANCELLED | OUTPATIENT
Start: 2017-01-01

## 2017-01-01 RX ORDER — HEPARIN SODIUM 5000 [USP'U]/ML
5000 INJECTION, SOLUTION INTRAVENOUS; SUBCUTANEOUS EVERY 8 HOURS SCHEDULED
Status: ON HOLD | COMMUNITY
End: 2017-01-01

## 2017-01-01 RX ORDER — OLANZAPINE 2.5 MG/1
2.5 TABLET ORAL NIGHTLY
Status: CANCELLED | OUTPATIENT
Start: 2017-01-01

## 2017-01-01 RX ORDER — IPRATROPIUM BROMIDE AND ALBUTEROL SULFATE 2.5; .5 MG/3ML; MG/3ML
3 SOLUTION RESPIRATORY (INHALATION)
Status: DISCONTINUED | OUTPATIENT
Start: 2017-01-01 | End: 2017-01-01

## 2017-01-01 RX ORDER — DIVALPROEX SODIUM 250 MG/1
250 TABLET, EXTENDED RELEASE ORAL EVERY MORNING
Status: ON HOLD | COMMUNITY
End: 2017-01-01

## 2017-01-01 RX ORDER — CLONAZEPAM 1 MG/1
1 TABLET ORAL 3 TIMES DAILY
Status: ON HOLD | COMMUNITY
End: 2017-01-01

## 2017-01-01 RX ORDER — SCOLOPAMINE TRANSDERMAL SYSTEM 1 MG/1
1 PATCH, EXTENDED RELEASE TRANSDERMAL
Status: DISCONTINUED | OUTPATIENT
Start: 2017-01-01 | End: 2017-01-01

## 2017-01-01 RX ORDER — LEVOFLOXACIN 5 MG/ML
750 INJECTION, SOLUTION INTRAVENOUS
Status: DISCONTINUED | OUTPATIENT
Start: 2017-01-01 | End: 2017-01-01

## 2017-01-01 RX ORDER — HYDROCODONE BITARTRATE AND ACETAMINOPHEN 5; 325 MG/1; MG/1
2 TABLET ORAL EVERY 6 HOURS PRN
Status: DISCONTINUED | OUTPATIENT
Start: 2017-01-01 | End: 2017-01-01

## 2017-01-01 RX ORDER — SODIUM PHOSPHATE, DIBASIC AND SODIUM PHOSPHATE, MONOBASIC 7; 19 G/133ML; G/133ML
1 ENEMA RECTAL ONCE AS NEEDED
Status: ACTIVE | OUTPATIENT
Start: 2017-01-01 | End: 2017-01-01

## 2017-01-01 RX ORDER — MORPHINE SULFATE 4 MG/ML
4 INJECTION, SOLUTION INTRAMUSCULAR; INTRAVENOUS EVERY 2 HOUR PRN
Status: DISCONTINUED | OUTPATIENT
Start: 2017-01-01 | End: 2017-01-01

## 2017-01-01 RX ORDER — WARFARIN SODIUM 10 MG/1
10 TABLET ORAL NIGHTLY
Status: DISCONTINUED | OUTPATIENT
Start: 2017-01-01 | End: 2017-01-01

## 2017-01-01 RX ORDER — HEPARIN SODIUM 1000 [USP'U]/ML
80 INJECTION, SOLUTION INTRAVENOUS; SUBCUTANEOUS ONCE
Status: COMPLETED | OUTPATIENT
Start: 2017-01-01 | End: 2017-01-01

## 2017-01-01 RX ORDER — ACETAMINOPHEN 325 MG/1
650 TABLET ORAL EVERY 6 HOURS PRN
Status: DISCONTINUED | OUTPATIENT
Start: 2017-01-01 | End: 2017-01-01

## 2017-01-01 RX ORDER — SODIUM CHLORIDE 0.9 % (FLUSH) 0.9 %
10 SYRINGE (ML) INJECTION AS NEEDED
Status: CANCELLED | OUTPATIENT
Start: 2017-01-01

## 2017-01-01 RX ORDER — GLYCOPYRROLATE 0.2 MG/ML
0.4 INJECTION, SOLUTION INTRAMUSCULAR; INTRAVENOUS
Status: CANCELLED | OUTPATIENT
Start: 2017-01-01

## 2017-01-01 RX ORDER — PHYTONADIONE 5 MG/1
2.5 TABLET ORAL ONCE
Status: COMPLETED | OUTPATIENT
Start: 2017-01-01 | End: 2017-01-01

## 2017-01-01 RX ORDER — SIMETHICONE 80 MG
80 TABLET,CHEWABLE ORAL 3 TIMES DAILY
Status: ON HOLD | COMMUNITY
End: 2017-01-01

## 2017-01-01 RX ORDER — IPRATROPIUM BROMIDE AND ALBUTEROL SULFATE 2.5; .5 MG/3ML; MG/3ML
3 SOLUTION RESPIRATORY (INHALATION) ONCE
Status: COMPLETED | OUTPATIENT
Start: 2017-01-01 | End: 2017-01-01

## 2017-01-01 RX ORDER — VITAMIN E 268 MG
400 CAPSULE ORAL DAILY
Status: DISCONTINUED | OUTPATIENT
Start: 2017-01-01 | End: 2017-01-01

## 2017-01-01 RX ORDER — CARVEDILOL 6.25 MG/1
6.25 TABLET ORAL 2 TIMES DAILY WITH MEALS
Status: DISCONTINUED | OUTPATIENT
Start: 2017-01-01 | End: 2017-01-01

## 2017-01-01 RX ORDER — DIVALPROEX SODIUM 250 MG/1
250 TABLET, DELAYED RELEASE ORAL NIGHTLY
Status: ON HOLD | COMMUNITY
End: 2017-01-01

## 2017-01-01 RX ORDER — DIVALPROEX SODIUM 500 MG/1
500 TABLET, EXTENDED RELEASE ORAL NIGHTLY
Status: DISCONTINUED | OUTPATIENT
Start: 2017-01-01 | End: 2017-01-01

## 2017-01-01 RX ORDER — CLONAZEPAM 1 MG/1
1 TABLET ORAL 3 TIMES DAILY
Status: CANCELLED | OUTPATIENT
Start: 2017-01-01

## 2017-01-01 RX ORDER — CALCIUM CARBONATE/VITAMIN D3 600 MG-10
1 TABLET ORAL DAILY
Status: ON HOLD | COMMUNITY
End: 2017-01-01

## 2017-01-01 RX ORDER — CASTOR OIL AND BALSAM, PERU 788; 87 MG/G; MG/G
OINTMENT TOPICAL 2 TIMES DAILY PRN
Status: DISCONTINUED | OUTPATIENT
Start: 2017-01-01 | End: 2017-01-01

## 2017-01-01 RX ORDER — DIVALPROEX SODIUM 500 MG/1
500 TABLET, DELAYED RELEASE ORAL DAILY
Status: ON HOLD | COMMUNITY
End: 2017-01-01

## 2017-01-01 RX ORDER — MAGNESIUM OXIDE 400 MG (241.3 MG MAGNESIUM) TABLET
400 TABLET ONCE
Status: COMPLETED | OUTPATIENT
Start: 2017-01-01 | End: 2017-01-01

## 2017-01-01 RX ORDER — DIVALPROEX SODIUM 250 MG/1
250 TABLET, EXTENDED RELEASE ORAL EVERY MORNING
Status: DISCONTINUED | OUTPATIENT
Start: 2017-01-01 | End: 2017-01-01

## 2017-01-01 RX ORDER — ACETAMINOPHEN 325 MG/1
650 TABLET ORAL EVERY 4 HOURS PRN
Status: DISCONTINUED | OUTPATIENT
Start: 2017-01-01 | End: 2017-01-01

## 2017-01-01 RX ORDER — POLYETHYLENE GLYCOL 3350 17 G/17G
17 POWDER, FOR SOLUTION ORAL EVERY 24 HOURS
Status: ON HOLD | COMMUNITY
End: 2017-01-01

## 2017-01-01 RX ORDER — POLYETHYLENE GLYCOL 3350 17 G/17G
17 POWDER, FOR SOLUTION ORAL DAILY PRN
Status: CANCELLED | OUTPATIENT
Start: 2017-01-01

## 2017-01-01 RX ORDER — OLANZAPINE 5 MG/1
2.5 TABLET ORAL NIGHTLY
Status: DISCONTINUED | OUTPATIENT
Start: 2017-01-01 | End: 2017-01-01

## 2017-01-01 RX ORDER — OLANZAPINE 5 MG/1
10 TABLET ORAL NIGHTLY
Status: DISCONTINUED | OUTPATIENT
Start: 2017-01-01 | End: 2017-01-01

## 2017-01-01 RX ORDER — ATROPINE SULFATE 10 MG/ML
2 SOLUTION/ DROPS OPHTHALMIC EVERY 2 HOUR PRN
Status: DISCONTINUED | OUTPATIENT
Start: 2017-01-01 | End: 2017-01-01

## 2017-01-01 RX ORDER — HYDROCODONE BITARTRATE AND ACETAMINOPHEN 5; 325 MG/1; MG/1
1 TABLET ORAL EVERY 4 HOURS PRN
Status: DISCONTINUED | OUTPATIENT
Start: 2017-01-01 | End: 2017-01-01

## 2017-01-01 RX ORDER — MELATONIN
800 DAILY
Status: DISCONTINUED | OUTPATIENT
Start: 2017-01-01 | End: 2017-01-01

## 2017-01-01 RX ORDER — SODIUM CHLORIDE 0.9 % (FLUSH) 0.9 %
5 SYRINGE (ML) INJECTION EVERY 8 HOURS
Status: DISCONTINUED | OUTPATIENT
Start: 2017-01-01 | End: 2017-01-01

## 2017-01-01 RX ORDER — POTASSIUM CHLORIDE 20 MEQ/1
40 TABLET, EXTENDED RELEASE ORAL ONCE
Status: COMPLETED | OUTPATIENT
Start: 2017-01-01 | End: 2017-01-01

## 2017-01-01 RX ORDER — ASCORBIC ACID 500 MG
500 TABLET ORAL DAILY
Status: DISCONTINUED | OUTPATIENT
Start: 2017-01-01 | End: 2017-01-01

## 2017-01-01 RX ORDER — DIVALPROEX SODIUM 500 MG/1
500 TABLET, EXTENDED RELEASE ORAL EVERY MORNING
Status: DISCONTINUED | OUTPATIENT
Start: 2017-01-01 | End: 2017-01-01

## 2017-01-01 RX ORDER — HEPARIN SODIUM AND DEXTROSE 10000; 5 [USP'U]/100ML; G/100ML
INJECTION INTRAVENOUS CONTINUOUS
Status: DISCONTINUED | OUTPATIENT
Start: 2017-01-01 | End: 2017-01-01

## 2017-01-01 RX ORDER — NYSTATIN 100000 U/G
CREAM TOPICAL 2 TIMES DAILY
Status: DISCONTINUED | OUTPATIENT
Start: 2017-01-01 | End: 2017-01-01

## 2017-01-01 RX ORDER — DIVALPROEX SODIUM 500 MG/1
500 TABLET, DELAYED RELEASE ORAL DAILY
Status: ON HOLD | COMMUNITY
End: 2017-01-01 | Stop reason: DRUGHIGH

## 2017-01-01 RX ORDER — ACETAMINOPHEN 325 MG/1
650 TABLET ORAL EVERY 6 HOURS PRN
Status: CANCELLED | OUTPATIENT
Start: 2017-01-01

## 2017-01-01 RX ORDER — MORPHINE SULFATE 2 MG/ML
2 INJECTION, SOLUTION INTRAMUSCULAR; INTRAVENOUS
Status: CANCELLED | OUTPATIENT
Start: 2017-01-01

## 2017-01-01 RX ORDER — LEVOFLOXACIN 5 MG/ML
750 INJECTION, SOLUTION INTRAVENOUS EVERY 24 HOURS
Status: DISCONTINUED | OUTPATIENT
Start: 2017-01-01 | End: 2017-01-01

## 2017-01-01 RX ORDER — ZINC SULFATE 50(220)MG
220 CAPSULE ORAL DAILY
Status: ON HOLD | COMMUNITY
End: 2017-01-01

## 2017-01-01 RX ORDER — CLONAZEPAM 2 MG/1
2 TABLET ORAL 2 TIMES DAILY
Qty: 60 TABLET | Refills: 0 | Status: ON HOLD | OUTPATIENT
Start: 2017-01-01 | End: 2017-01-01

## 2017-01-01 RX ORDER — NYSTATIN 100000 U/G
1 CREAM TOPICAL 2 TIMES DAILY
Qty: 1 TUBE | Refills: 0 | Status: ON HOLD | OUTPATIENT
Start: 2017-01-01 | End: 2017-01-01

## 2017-01-01 RX ORDER — SCOLOPAMINE TRANSDERMAL SYSTEM 1 MG/1
1 PATCH, EXTENDED RELEASE TRANSDERMAL
Status: DISCONTINUED | OUTPATIENT
Start: 2017-04-06 | End: 2017-01-01

## 2017-01-01 RX ORDER — MORPHINE SULFATE 2 MG/ML
2 INJECTION, SOLUTION INTRAMUSCULAR; INTRAVENOUS
Status: DISCONTINUED | OUTPATIENT
Start: 2017-01-01 | End: 2017-01-01

## 2017-01-01 RX ORDER — LACTULOSE 10 G/15ML
20 SOLUTION ORAL 2 TIMES DAILY
Status: DISCONTINUED | OUTPATIENT
Start: 2017-01-01 | End: 2017-01-01

## 2017-01-01 RX ORDER — LORAZEPAM 2 MG/ML
1 INJECTION INTRAMUSCULAR EVERY 4 HOURS PRN
Status: CANCELLED | OUTPATIENT
Start: 2017-01-01

## 2017-01-01 RX ORDER — DOXYCYCLINE 100 MG/1
100 CAPSULE ORAL DAILY
Status: DISCONTINUED | OUTPATIENT
Start: 2017-01-01 | End: 2017-01-01

## 2017-01-01 RX ORDER — MORPHINE SULFATE 2 MG/ML
2 INJECTION, SOLUTION INTRAMUSCULAR; INTRAVENOUS EVERY 2 HOUR PRN
Status: DISCONTINUED | OUTPATIENT
Start: 2017-01-01 | End: 2017-01-01

## 2017-01-01 RX ORDER — DIVALPROEX SODIUM 250 MG/1
1250 TABLET, DELAYED RELEASE ORAL NIGHTLY
Status: ON HOLD | COMMUNITY
End: 2017-01-01 | Stop reason: DRUGHIGH

## 2017-01-01 RX ORDER — HEPARIN SODIUM 5000 [USP'U]/ML
5000 INJECTION, SOLUTION INTRAVENOUS; SUBCUTANEOUS EVERY 8 HOURS
Status: DISCONTINUED | OUTPATIENT
Start: 2017-01-01 | End: 2017-01-01

## 2017-01-01 RX ORDER — FUROSEMIDE 20 MG/1
20 TABLET ORAL DAILY
Status: ON HOLD | COMMUNITY
End: 2017-01-01

## 2017-01-01 RX ORDER — DIVALPROEX SODIUM 250 MG/1
1000 TABLET, DELAYED RELEASE ORAL NIGHTLY
Status: DISCONTINUED | OUTPATIENT
Start: 2017-01-01 | End: 2017-01-01

## 2017-01-01 RX ORDER — CARVEDILOL 3.12 MG/1
3.12 TABLET ORAL 2 TIMES DAILY WITH MEALS
Status: DISCONTINUED | OUTPATIENT
Start: 2017-01-01 | End: 2017-01-01

## 2017-01-01 RX ORDER — HYDROCODONE BITARTRATE AND ACETAMINOPHEN 5; 325 MG/1; MG/1
1 TABLET ORAL EVERY 6 HOURS PRN
Status: DISCONTINUED | OUTPATIENT
Start: 2017-01-01 | End: 2017-01-01

## 2017-01-01 RX ORDER — ZINC SULFATE 50(220)MG
220 CAPSULE ORAL DAILY
Status: DISCONTINUED | OUTPATIENT
Start: 2017-01-01 | End: 2017-01-01

## 2017-01-01 RX ORDER — MAGNESIUM OXIDE 400 MG (241.3 MG MAGNESIUM) TABLET
400 TABLET 2 TIMES DAILY
Status: ON HOLD | COMMUNITY
End: 2017-01-01

## 2017-01-01 RX ORDER — ASPIRIN 81 MG/1
81 TABLET ORAL DAILY
Status: DISCONTINUED | OUTPATIENT
Start: 2017-01-01 | End: 2017-01-01

## 2017-01-01 RX ORDER — IPRATROPIUM BROMIDE AND ALBUTEROL SULFATE 2.5; .5 MG/3ML; MG/3ML
3 SOLUTION RESPIRATORY (INHALATION)
Status: CANCELLED | OUTPATIENT
Start: 2017-01-01

## 2017-01-30 PROBLEM — E87.3 METABOLIC ALKALOSIS: Status: ACTIVE | Noted: 2017-01-01

## 2017-01-30 PROBLEM — R79.89 AZOTEMIA: Status: ACTIVE | Noted: 2017-01-01

## 2017-01-30 PROBLEM — J96.21 ACUTE ON CHRONIC RESPIRATORY FAILURE WITH HYPOXIA (HCC): Status: ACTIVE | Noted: 2017-01-01

## 2017-01-30 PROBLEM — J44.1 COPD EXACERBATION (HCC): Status: ACTIVE | Noted: 2017-01-01

## 2017-01-30 PROBLEM — E87.0 HYPERNATREMIA: Status: ACTIVE | Noted: 2017-01-01

## 2017-01-30 NOTE — CONSULTS
[facility]    Report of Consultation    Kellen Cadet Patient Status:  Inpatient    1945 MRN [id number]   Location [facility] 4W/SW/SE Attending Ernestine Gómez Day # 0 PCP Raj Oconnor MD     Date of (congestive heart failure) (HCC)    • Depression    • Hearing impairment    • Muscle weakness    • Back problem    • Presence of vena cava filter        Past Surgical History      Past Surgical History    HERNIA SURGERY      Comment inguinal hernia repair Oral Q6H PRN   acetaminophen (TYLENOL) tab 650 mg 650 mg Oral Q4H PRN   Or      HYDROcodone-acetaminophen (NORCO) 5-325 MG per tab 1 tablet 1 tablet Oral Q4H PRN   Or      HYDROcodone-acetaminophen (NORCO) 5-325 MG per tab 2 tablet 2 tablet Oral Q4H PRN daily. zinc 50 mg tablet    carvedilol (COREG) 6.25 MG Oral Tab Take 9.375 mg by mouth 2 (two) times daily with meals. clonazepam (KLONOPIN) 0.5 MG Oral Tab 1.25 mg 4 (four) times daily.        Allergies    Haldol Decanoate          Haloperidol Motor: 5/5 strength in the upper and lower extremities. Tone normal. No pronator drift . Sensory: Sensory exam intact to pin and temperature, without a sensory level. Diminished vibratory sense at the ankles. DTR: Hyporeflexic.   No response to planta

## 2017-01-30 NOTE — ED PROVIDER NOTES
Patient Seen in: [facility] Emergency Department    History   Patient presents with:  Fall (musculoskeletal, neurologic)      HPI    Patient presents from home after slowly sliding from his chair and being able to get up off the floor.   He been tr Medications :    (Not in a hospital admission)     Family History   Problem Relation Age of Onset   • Cancer Father    • Pulmonary Disease Father      emphysema   • Stroke Paternal Grandmother    • Heart Disease Mother 80     CAD   • Hypertension M Physical Exam   Constitutional: He is oriented to person, place, and time. No distress. Morbidly obese, appears generally weak   HENT:   Head: Normocephalic and atraumatic.    Nose: Nose normal.   Eyes: Conjunctivae are normal. Right eye exhibits no Xr Chest Ap Portable  (cpt=71010)    1/30/2017  CONCLUSION:  1. Little change from September 13, 2015. 2. Cardiomegaly. 3. Atherosclerosis. 4. Scarring/atelectasis. 5. Demineralization. 6. Scoliosis. 7. Osteoarthritis. 8. Pacemaker.          ANDREW     P

## 2017-01-30 NOTE — DISCHARGE PLANNING
MAURICIO met with the pt. At bedside. The pt. Lives with his wife ashley bilevel home with the bedrooms on the 2nd floor. The pt. Reports being independent prior to admission with adls, ambulation and no driving x 6 months. The pt's wife drives. The pt.  Has 1 c

## 2017-01-30 NOTE — ED INITIAL ASSESSMENT (HPI)
Patient to ED via EMS c/o low back pain s/p slip/fall. Denies head inj/LOC/CP/dizziness/dyspnea/extremity pain.

## 2017-01-31 PROBLEM — F31.70 BIPOLAR AFFECTIVE DISORDER IN REMISSION (HCC): Status: ACTIVE | Noted: 2017-01-01

## 2017-01-31 PROBLEM — F31.12 BIPOLAR I DISORDER, MOST RECENT EPISODE (OR CURRENT) MANIC, MODERATE (HCC): Status: ACTIVE | Noted: 2017-01-01

## 2017-01-31 NOTE — H&P
[facility]    PATIENT'S NAME: Na Raman   ATTENDING PHYSICIAN: Guillermo Gómez MD   PATIENT ACCOUNT#:   [id number]    LOCATION:  [address] [place] [address] RECORD #:   [id number]       YOB: 1945  ADMISSION DATE:       0 history is significant for the following:  Bilateral cataracts and drusen with floaters, hypertension, decreased platelets that have been decreased for years, evaluated by Dr. Josh Vick and related to Depakote, sleep apnea with a pressure of 9, bronchitis, and redness. RECTAL:  Exam not done. BREASTS:  Exam not done. EXTREMITIES:  He has venous stasis changes. Weak bilateral dorsalis pedis pulses. MUSCULOSKELETAL:  No joint deformities. LYMPHATICS:  No scalene or submandibular lymphadenopathy.   Day Wynn

## 2017-01-31 NOTE — PROGRESS NOTES
[place] [facility]    Progress Note    Kellen Helio Patient Status:  Inpatient    1945 MRN [id number]   Location [facility] 4W/SW/SE Attending Ernestine Gómez Day # 1 PCP Raj Oconnor MD       Subjective: ammonia may be contributing to his lethargy, although it would be unusual for it to be a problem after being on Depakote chronically.   After discussion with the wife, we elected to hold the Depakote to see if there would be any clinical improvement, since injection 4 mg 4 mg Intravenous Q4H PRN   docusate sodium (COLACE) cap 100 mg 100 mg Oral BID   PEG 3350 (MIRALAX) powder packet 17 g 17 g Oral Daily PRN   magnesium hydroxide (MILK OF MAGNESIA) 400 MG/5ML suspension 30 mL 30 mL Oral Daily PRN   bisacodyl

## 2017-01-31 NOTE — OCCUPATIONAL THERAPY NOTE
OCCUPATIONAL THERAPY EVALUATION - INPATIENT     Room Number: 441/441-A  Evaluation Date: 1/31/2017  Type of Evaluation: Initial  Presenting Problem:  (s/p fall with weakness)    Physician Order: IP Consult to Occupational Therapy  Reason for Therapy: ADL/I • Sleep apnea    • Age-related nuclear cataract of both eyes 7/15/2014   • Drusen of both optic discs 7/15/2014   • Thrombocytopenia, unspecified ([facility] [address] 7/3/2012   • Bronchitis    • TIA (transient ischemic attack)    • Encephalopathy    • Sleep apnea understand    RANGE OF MOTION   Upper extremity ROM is within functional limits   STRENGTH ASSESSMENT  Upper extremity strength is observed to be at least 3/5        ACTIVITIES OF DAILY LIVING ASSESSMENT  AM-PAC ‘6-Clicks’ Inpatient Daily Activity Short Fo

## 2017-01-31 NOTE — PROGRESS NOTES
[place] [facility] - [facility]    Progress Note    Alex Aguilar Patient Status:  Inpatient    1945 MRN [id number]   Location [facility] 4W/SW/SE Attending Ernestine Gómez Day # 1 PCP Roz Cano MD     Subjective: not work.  He brought in his own.     8.      Tremors.  Will continue Klonopin at his home dose. 9.      Viral encephalitis, neuropathy back in 1992, 1993.  Follow up with Dr. Jessica Ivey.   10.    Acute encephalopathy inc ammonia    40 min spent on pt of [facility] MD  1/31/2017

## 2017-01-31 NOTE — PLAN OF CARE
Problem: Patient/Family Goals  Goal: Patient/Family Long Term Goal  Patient’s Long Term Goal: To maintain mobility    Interventions:  Evaluation by Physical medicine, out of bed TID per orders.   - See additional Care Plan goals for specific interventions devices as appropriate  - Consider OT/PT consult to assist with strengthening/mobility  - Encourage toileting schedule   Outcome: Progressing    Comments:   Patient very limited in mobility, helps to turn self a bit.  Patient had several incontinent occurre

## 2017-01-31 NOTE — PHYSICAL THERAPY NOTE
PHYSICAL THERAPY EVALUATION - INPATIENT     Room Number: 441/441-A  Evaluation Date: 1/31/2017  Type of Evaluation: Initial  Physician Order: PT Eval and Treat    Presenting Problem: COPD exacerbation  Reason for Therapy: Mobility Dysfunction and Disch Home Layout: Two level; Other (Comment) (chair lift available)  Stairs to Enter : 0     Stairs to Bedroom: 0       Lives With: Spouse  Drives: No  Patient Owned Equipment: Rolling walker;Cane;Other (Comment) (wheelchair, transport chair)  Patient Regularl chair with arms (e.g., wheelchair, bedside commode, etc.): Unable   -   Moving from lying on back to sitting on the side of the bed?: Unable   How much help from another person does the patient currently need. ..   -   Moving to and from a bed to a chair (i discharge as he is unsafe to return home with wife's assist at this time. RN Sneha aware of pt. Status and plan post session.      DISCHARGE RECOMMENDATIONS  PT Discharge Recommendations: Cont skilled therapy in a supervised setting;24 hour care/supervisio

## 2017-01-31 NOTE — SLP NOTE
ADULT SWALLOWING EVALUATION    ASSESSMENT & PLAN   ASSESSMENT  Pt seen sitting upright in bed accompanied by his wife. Pt alert, oriented and cooperative.  PO trials of water, nectar thick liquids, honey thick liquids and augie cracker soaked in apple sauc Diet Consistency: Regular;Nectar thick liquids  Dysphagia History: No known hx at [facility], but wife reported hx of dysphagia.    Imaging Results: CXR 1/30/17: min scarring/atelectasis noted, no significant pulmonary parenchymal abnormalities, no effusion or pl Goal #3 The patient will tolerate trial upgrade of thin liquids without overt signs or symptoms of aspiration with 100 % accuracy over 3 session(s).      Plan: SLP to follow for diet macrina, upgrade trials of thin liquids, and education of swallow strategies

## 2017-02-01 NOTE — CONSULTS
PHYSICAL MEDICINE AND REHABILITATION CONSULTATION     CC: Impaired mobility and ADL dysfunction secondary to acute encephalopathy     HPI: This is a 31-year-old, white male, with PMH of encephalitis, GBS, bilateral cataracts, bipolar disorder, htn, decreas ipratropium-albuterol (DUONEB) nebulizer solution 3 mL 3 mL Nebulization Once   clotrimazole-betamethasone (LOTRISONE) cream 1 Application 1 Application Topical PRN   aspirin EC tab 81 mg 81 mg Oral Daily   carvedilol (COREG) tab 9.375 mg 9.375 mg Oral BID 133 mL 1 enema Rectal Once PRN   [DISCONTINUED] ClonazePAM (KLONOPIN) tab 2.5 mg 2.5 mg Oral BID       Allergies:    Haldol Decanoate          Haloperidol                 Comment:Other reaction(s): HALOPERIDOL  Risperdal [Risperid*    Unknown  Sulfa Antibi cholesterol   • Depression Daughter    • Glaucoma Neg      multiple   • Macular degeneration Neg      multiple   • Diabetes Neg      multiple       Social History:  Social History    Marital Status:              Spouse Name:                       Zenaidamaylin Carrizalesyogesh 01/31/2017   BUN 23 01/31/2017    01/31/2017   K 4.7 01/31/2017    01/31/2017   CO2 34 01/31/2017    01/31/2017   CA 8.8 01/31/2017   ALB 2.9 01/31/2017   ALKPHO 59 01/31/2017   BILT 0.7 01/31/2017   TP 6.4 01/31/2017   AST 36 01/31/2017 potential is fair. DC goal is home with family at a min assist   Level. Would be happy to reassess this patient should his/her medical and/or functional status change.      Thank you for this consultation,  MD Lizbeth Egan

## 2017-02-01 NOTE — PROGRESS NOTES
[place] [facility] - [facility]    Progress Note    Hanley Dakins Patient Status:  Inpatient    1945 MRN [id number]   Location [facility] 4W/SW/SE Attending Lacy Ghotra, [address] [facility] Day # 2 PCP Tom Moreira MD       Subjective:   Shon Bernard Sodium (Porcine) 5000 UNIT/ML injection 5,000 Units 5,000 Units Subcutaneous Q8H   acetaminophen (TYLENOL) tab 650 mg 650 mg Oral Q6H PRN   acetaminophen (TYLENOL) tab 650 mg 650 mg Oral Q4H PRN   Or      HYDROcodone-acetaminophen (NORCO) 5-325 MG per tab L3-4 central spinal stenosis. 3. No evidence of focal disc herniation.                 Results:     CBC:    Lab Results  Component Value Date   WBC 8.3 01/31/2017   WBC 7.9 01/30/2017     Lab Results  Component Value Date   HGB 16.6 01/31/2017   HGB 17.2 01 2/1/2017

## 2017-02-01 NOTE — PROGRESS NOTES
Misbah Romero is a 80year old   male with a chronic history of obesity, sleep apnea, COPD and bipolar disorder who presented to the hospital with recent fall and COPD exacerbation.   The patient seen today for 35 minute, follow-up ana lilia ischemic attack)    • Encephalopathy    • Sleep apnea      CPAP   • Complete heart block ([facility] [address]      2012:  pacemaker   • Bipolar disorder ([facility] [address]    • BPH (benign prostatic hyperplasia)    • Other ill-defined conditions(799.89)      \"hernia\"   • Dermatocha Monohydrate (MONODOX) cap 100 mg 100 mg Oral Daily   finasteride (PROSCAR) tab 5 mg 5 mg Oral Daily   folic acid (FOLVITE) tab 800 mcg 800 mcg Oral Daily   multivitamin (ADULT) tab 1 tablet 1 tablet Oral Daily   OLANZapine (ZYPREXA) tab 2.5 mg 2.5 mg Oral 01/31/2017   * 01/31/2017   CA 8.8 01/31/2017   ALB 2.9* 01/31/2017   ALB 3.71* 01/31/2017   ALKPHO 59 01/31/2017   TP 6.4 01/31/2017   TP 6.9 01/31/2017   AST 36 01/31/2017   ALT 24 01/31/2017   TSH 0.51 01/31/2017   ESRML 6 01/31/2017   MG 1.8 02/ disorder in remission. Discussed risk and benefit, acknowledging the current symptom and severity.  Noticeably Depakote has been causing increase ammonia level and lower platelet.  It is indicated to lower Depakote.   At this point, I would recommend t

## 2017-02-01 NOTE — PROGRESS NOTES
[facility]    Progress Note    Yesy Boo Patient Status:  Inpatient    1945 MRN [id number]   Location [facility] 4W/SW/SE Attending Andrea Lee, [address] [facility] Day # 2 PCP Kirti Castro MD       Subjective:   Aaron Esteves 2.5 mg 2.5 mg Oral BID   divalproex Sodium ER (DEPAKOTE) 24 hr tab 500 mg 500 mg Oral Nightly   clotrimazole-betamethasone (LOTRISONE) cream 1 Application 1 Application Topical PRN   aspirin EC tab 81 mg 81 mg Oral Daily   carvedilol (COREG) tab 9.375 mg 9 117* 01/31/2017   CREATSERUM 1.03 01/31/2017   BUN 23* 01/31/2017    01/31/2017   K 4.7 01/31/2017    01/31/2017   CO2 34* 01/31/2017   * 01/31/2017   CA 8.8 01/31/2017   ALB 2.9* 01/31/2017   ALB 3.71* 01/31/2017   ALKPHO 59 01/31/2017

## 2017-02-01 NOTE — CONSULTS
[facility]    Report of Consultation    Katlin Molina Patient Status:  Inpatient    1945 MRN [id number]   Location [facility] 4W/SW/SE Attending Brian Cantu,*   Hosp Day # 1 PCP Phoenix Lamb MD     Date to change his Depakote to Tegretol and patient get more confused and delusional.  Patient at that point according to wife he has DVT in the psych unit for lack of mobility.   Wife believe his current medication has been stabilizing his mood and she would no Surgical History    HERNIA SURGERY      Comment inguinal hernia repair    KNEE ARTHROSCOPY      OTHER SURGICAL HISTORY  2009    Comment prostate biopsy    LAPAROSCOPIC CHOLECYSTECTOMY      CHOLECYSTECTOMY      CARDIAC PACEMAKER PLACEMENT         Family His mg Oral Q4H PRN   Or      HYDROcodone-acetaminophen (NORCO) 5-325 MG per tab 1 tablet 1 tablet Oral Q4H PRN   Or      HYDROcodone-acetaminophen (NORCO) 5-325 MG per tab 2 tablet 2 tablet Oral Q4H PRN   morphINE sulfate (PF) 2 MG/ML injection 1 mg 1 mg Intr times daily.        Allergies    Haldol Decanoate          Haloperidol                 Comment:Other reaction(s): HALOPERIDOL  Risperdal [Risperid*    Unknown  Sulfa Antibiotics           Comment:Other reaction(s): Unknown  Thorazine  [Chlorpr*          Rev related to previous stroke has been dysarthric. The patient report feeling fine and denying any major complain. Patient presented with a flat affect with noticeable sedation. Patient denying any homicidal or suicidal ideation.   Patient denying any audit

## 2017-02-01 NOTE — SLP NOTE
SPEECH DAILY NOTE - INPATIENT    Evaluation Date: 02/01/2017    ASSESSMENT & PLAN   ASSESSMENT  Pt seen for swallowing therapy to monitor swallowing tolerance on least restrictive diet of general solids and nectar thick liquids.   Pt's wife was present and understanding of strategies. The trials were presented to the pt by SLP in controlled amounts. The pt was repositioned to an upright sitting position. Trials were presented at a slow rate with alternating consistencies.   Mild cues provided for multiple

## 2017-02-01 NOTE — CHRONIC PAIN
[facility]  Report of Consultation    Clio Leos Patient Status:  Inpatient    1945 MRN [id number]   Location [facility] 4W/SW/SE Attending Gemma Meredith, [address] [facility] Day # 2 PCP Tomás Galvan MD     Date of Admission:  1/3 HISTORY  2009    Comment prostate biopsy    LAPAROSCOPIC CHOLECYSTECTOMY      CHOLECYSTECTOMY      CARDIAC PACEMAKER PLACEMENT       Family History   Problem Relation Age of Onset   • Cancer Father    • Pulmonary Disease Father      emphysema   • Stroke Pa sulfate (ZINCATE) cap 220 mg, 220 mg, Oral, Daily  •  Heparin Sodium (Porcine) 5000 UNIT/ML injection 5,000 Units, 5,000 Units, Subcutaneous, Q8H  •  acetaminophen (TYLENOL) tab 650 mg, 650 mg, Oral, Q6H PRN  •  acetaminophen (TYLENOL) tab 650 mg, 650 mg, reports lower back pain. Patient spiked a fever of 102 last evening. Recommend waiting until patient afebrile for 24 hours prior to epidural steroid injection. Will need heparin held tomorrow prior to procedure.   I have informed Mariola Hearn  of

## 2017-02-02 NOTE — PLAN OF CARE
DISCHARGE PLANNING    • Discharge to home or other facility with appropriate resources Progressing    Plan to discharge to [address] when medically stable.        PAIN - ADULT    • Verbalizes/displays adequate comfort level or patient's st

## 2017-02-02 NOTE — PROGRESS NOTES
[facility]  Inpatient Pain Management Progress Note      Patient name: Trev uH 80year old male  : 1945  MRN: [id number]    Diagnosis:  Low back pain    Reason for Consult: Low back pain  Current hospital day: Hospital D

## 2017-02-02 NOTE — PHYSICAL THERAPY NOTE
PHYSICAL THERAPY TREATMENT NOTE - INPATIENT    Room Number: 269/463-X       Presenting Problem: COPD exacerbation    Problem List  Principal Problem:    COPD exacerbation ([address]  Active Problems:    Hypernatremia    Azotemia    Metabolic alkalosis    Acute difficulty does the patient currently have. ..  -   Turning over in bed (including adjusting bedclothes, sheets and blankets)?: Unable   -   Sitting down on and standing up from a chair with arms (e.g., wheelchair, bedside commode, etc.): Unable   -   Movin

## 2017-02-02 NOTE — OCCUPATIONAL THERAPY NOTE
OCCUPATIONAL THERAPY TREATMENT NOTE - INPATIENT     Room Number: 315/140-B   Presenting Problem:  (s/p fall with weakness)    Problem List  Principal Problem:    COPD exacerbation ([address]  Active Problems:    Hypernatremia    Azotemia    Metabolic alkalosis rinsing, drying)?: Total  -   Toileting, which includes using toilet, bedpan or urinal? : Total  -   Putting on and taking off regular upper body clothing?: Total  -   Taking care of personal grooming such as brushing teeth?: A Lot  -   Eating meals?: A Lo

## 2017-02-02 NOTE — PROGRESS NOTES
[facility]    Progress Note    Lady Mccollum Patient Status:  Inpatient    1945 MRN [id number]   Location [facility] 4W/SW/SE Attending Sandrita Sandra, [address] [facility] Day # 3 PCP Kathrin Velez MD       Subjective:   Diony Bravo reduce his Klonopin because of some of the lethargy. I did discuss the plan with the patient and his wife.         Medications:     Current Facility-Administered Medications:  lactulose (CHRONULAC) 10 GM/15ML solution 20 g 20 g Oral Daily   Normal Saline F OF MAGNESIA) 400 MG/5ML suspension 30 mL 30 mL Oral Daily PRN   bisacodyl (DULCOLAX) rectal suppository 10 mg 10 mg Rectal Daily PRN       Results:     Lab Results  Component Value Date   WBC 8.3 02/02/2017   HGB 16.2 02/02/2017   HCT 50.4 02/02/2017   PLT

## 2017-02-02 NOTE — PHYSICAL THERAPY NOTE
Attempted physical therapy treatment. Per RN Jericho Billy, patient just returned to bed. Will re-attempt later as appropriate.

## 2017-02-02 NOTE — PROGRESS NOTES
[facility]    Progress Note    Vikram Pal Patient Status:  Inpatient    1945 MRN [id number]   Location [facility] 4W/SW/SE Attending Sahil Shultz, [address] [facility] [address] Day # 3 PCP Sonia Alberts MD       Subjective:   Angela Phelan Units Subcutaneous Q8H   acetaminophen (TYLENOL) tab 650 mg 650 mg Oral Q6H PRN   acetaminophen (TYLENOL) tab 650 mg 650 mg Oral Q4H PRN   Or      HYDROcodone-acetaminophen (NORCO) 5-325 MG per tab 1 tablet 1 tablet Oral Q4H PRN   Or      HYDROcodone-aceta 95* 02/02/2017   * 01/31/2017   * 01/30/2017       Recent Labs   Lab  01/31/17   0527  02/02/17   0528   GLU  126*  121*   BUN  23*  28*   CREATSERUM  1.03  0.90   GFRAA  >60  >60   GFRNAA  >60  >60   CA  8.8  9.0   NA  143  141   K  4.7  4.3

## 2017-02-03 NOTE — SLP NOTE
SPEECH DAILY NOTE - INPATIENT      ASSESSMENT & PLAN   ASSESSMENT    Pt seen upright in chair with current diet of solid/nectar-thick liquids for monitoring of diet tolerance.   Swallowing precautions/strategies discussed and demonstrated with Pt and spouse session(s). Pt trialed with thin liquids via controlled open cup. Pharyngeal response triggered 1-2 sec delayed. No overt clinical signs of aspiration on any swallows (no coughing, no throat clearing and clear vocal quality).  Rec upgrade to thin liquids

## 2017-02-03 NOTE — PLAN OF CARE
CARDIOVASCULAR - ADULT    • Absence of cardiac arrhythmias or at baseline Progressing    TELE IN PLACE.  NO CHANGES    DISCHARGE PLANNING    • Discharge to home or other facility with appropriate resources Progressing    PLAN IS PROVIDENCE     MUSCULOSKELET

## 2017-02-03 NOTE — PROGRESS NOTES
[place] [facility] - [facility]    Progress Note    Alida Rutledge Patient Status:  Inpatient    1945 MRN [id number]   Location [facility] 4W/SW/SE Attending Rudy Hastings, [address] [facility] Day # 4 PCP Marcellus Hahn MD       Subjective:   Valerie Monahan tab 650 mg 650 mg Oral Q4H PRN   Or      HYDROcodone-acetaminophen (NORCO) 5-325 MG per tab 1 tablet 1 tablet Oral Q4H PRN   Or      HYDROcodone-acetaminophen (NORCO) 5-325 MG per tab 2 tablet 2 tablet Oral Q4H PRN   morphINE sulfate (PF) 2 MG/ML injection NA  141  143   K  4.3  4.0   CL  99  100   CO2  36*  35*             Assessment and Plan:       1.       disc disease,  causing weakness and fall.  CT with lumbar disc disease. Apprec pain service consult. Plan for Steroid injection.      2.      Acute ch

## 2017-02-03 NOTE — PROGRESS NOTES
[facility]    Progress Note    Herberth Malik Patient Status:  Inpatient    1945 MRN [id number]   Location [facility] 4W/SW/SE Attending Eda Rojo, [address] [facility] Day # 4 PCP Bea Soto MD       Subjective:   Deven Barnes (COREG) tab 9.375 mg 9.375 mg Oral BID with meals   Cholecalciferol (VITAMIN D) 1000 units tab 1,000 Units 1,000 Units Oral Daily   Doxycycline Monohydrate (MONODOX) cap 100 mg 100 mg Oral Daily   finasteride (PROSCAR) tab 5 mg 5 mg Oral Daily   folic acid 01/31/2017   ALT 24 01/31/2017   TSH 0.51 01/31/2017   ESRML 6 01/31/2017   MG 1.9 02/02/2017   TROP 0.01 01/30/2017   B12 517 01/31/2017       Xr Chest Ap/pa (1 View) (cpt=71010)    2/1/2017  CONCLUSION:  1. Mild stable cardiomegaly. Pacemaker.  No acute f

## 2017-02-03 NOTE — PLAN OF CARE
CARDIOVASCULAR - ADULT    • Absence of cardiac arrhythmias or at baseline Progressing    NO ARRHYTHMIAS PER TELE.      DISCHARGE PLANNING    • Discharge to home or other facility with appropriate resources Progressing    PLAN IS TO GO TO [facility]

## 2017-02-03 NOTE — PROGRESS NOTES
Hanley Dakins is a 80year old   male with a chronic history of obesity, sleep apnea, COPD and bipolar disorder who presented to the hospital with recent fall and COPD exacerbation.   The patient seen today for 35 minute, follow-up ana lilia • Vitreous floaters      OS   • CHF (congestive heart failure) ([facility])    • Depression    • Hearing impairment    • Muscle weakness    • Back problem    • Presence of vena cava filter           Past Surgical History    HERNIA SURGERY      Comment inguinal Daily   Heparin Sodium (Porcine) 5000 UNIT/ML injection 5,000 Units 5,000 Units Subcutaneous Q8H   acetaminophen (TYLENOL) tab 650 mg 650 mg Oral Q6H PRN   acetaminophen (TYLENOL) tab 650 mg 650 mg Oral Q4H PRN   Or      HYDROcodone-acetaminophen (NORCO) 5 Pacemaker. No acute finding.            Vitals   02/02/17  2140   BP: 133/78   Pulse: 87   Temp: 98.3 °F (36.8 °C)   Resp: 22       PHYSICAL EXAM:   The patient is alert and oriented ×3.  Morbidly obese male laying down on his bed with noticeable psychomoto Acid, (Depakene)  Ammonia, Plasma  Ammonia, Plasma  Magnesium  Magnesium  Urinalysis with Culture Reflex Once  Basic Metabolic Panel (8)  CBC With Differential With Platelet  Basic Metabolic Panel (8)  CBC With Differential With Platelet    Meds This Visit

## 2017-02-03 NOTE — PROGRESS NOTES
[facility]  Inpatient Pain Management Progress Note      Patient name: Antonia Osorio 80year old male  : 1945  MRN: [id number]    Diagnosis: low back pain    Reason for Consult: low back pain    Current hospital day: KAYLEY RASCON

## 2017-02-03 NOTE — PLAN OF CARE
RESPIRATORY - ADULT    • Achieves optimal ventilation and oxygenation Not Progressing    2L O2 PER NC IS NEEDED. DESATS TO MID 80'S WITHOUT OXYGEN. CPAP HS.

## 2017-02-04 NOTE — PLAN OF CARE
CARDIOVASCULAR - ADULT    • Absence of cardiac arrhythmias or at baseline Progressing        DISCHARGE PLANNING    • Discharge to home or other facility with appropriate resources Progressing        MUSCULOSKELETAL - ADULT    • Maintain proper alignment of

## 2017-02-04 NOTE — PLAN OF CARE
Problem: Patient/Family Goals  Goal: Patient/Family Short Term Goal  Patient’s Short Term Goal: To resolve lower back pain. Interventions:   Continue with current medical management.    - See additional Care Plan goals for specific interventions   Outco IN 90S. Comments:   1 unit Platelets given overnight. POC if PLTS>100, then steroid injection will be given for back pain. Patient safety maintained, urinal/incontinence care prn w/prompt perineal care & lip care.   CPAP at hs and 0.5LNC for sats in low

## 2017-02-04 NOTE — PHYSICAL THERAPY NOTE
PHYSICAL THERAPY TREATMENT NOTE - INPATIENT    Room Number: 441/441-A     Session: pm       Presenting Problem: COPD exacerbation    Problem List  Principal Problem:    COPD exacerbation ([address]  Active Problems:    Hypernatremia    Azotemia    Metabolic al (Comment)  Location: Lower back  Management Techniques: Activity promotion; Body mechanics;Repositioning    BALANCE                                                                                                                     Static Sitting: Poor  Dyn unspecified ([facility]     NILSA on CPAP     COPD exacerbation (HCC)     Hypernatremia     Azotemia     Metabolic alkalosis     Acute on chronic respiratory failure with hypoxia (HCC)     Ataxia     Bipolar affective disorder in remission ([facility]                  A steps with a railing?: Total    AM-PAC Score:  Raw Score: 8   PT Approx Degree of Impairment Score: 86.62%   Standardized Score (AM-PAC Scale): 28.58   CMS Modifier (G-Code): CM    FUNCTIONAL ABILITY STATUS  Gait Assessment   Gait Assistance: Not tested

## 2017-02-04 NOTE — PLAN OF CARE
Blood bank called at 2115 regarding the platelets ordered for the patient. They are awaiting a delivery and may not have platelets available until early morning.

## 2017-02-04 NOTE — PLAN OF CARE
Blood Bank called for update, platelet delivery has arrived, RN released blood per policy and blood bank will be sending up shortly. Patient informed.

## 2017-02-04 NOTE — PROCEDURES
Procedure Lumbar epidural steroid injection l34  Indication lumbar ddd spinal stenosis  Post diagnosis  Same   Sterile  Prep sitting lateral c arm needle visualized  marly on second pass neg aspirattion   80 mg depomedrol 2 cc ns tolerated well to floor no c

## 2017-02-04 NOTE — PROGRESS NOTES
[facility]    Progress Note    Darrell Pineda Patient Status:  Inpatient    1945 MRN [id number]   Location [facility] 4W/SW/SE Attending Tao Valerio, [address] [facility] Day # 5 PCP Donovan Joseph MD       Subjective:   James Sal Units 1,000 Units Oral Daily   Doxycycline Monohydrate (MONODOX) cap 100 mg 100 mg Oral Daily   finasteride (PROSCAR) tab 5 mg 5 mg Oral Daily   folic acid (FOLVITE) tab 800 mcg 800 mcg Oral Daily   multivitamin (ADULT) tab 1 tablet 1 tablet Oral Daily   V >60  >60   CA  9.0  9.1  9.2   NA  141  143  143   K  4.3  4.0  3.7   CL  99  100  97   CO2  36*  35*  39*             Assessment and Plan:       1.       disc disease,  causing weakness and fall.  CT with lumbar disc disease. Apprec pain service consult.

## 2017-02-05 NOTE — PROGRESS NOTES
[facility]    Progress Note    Dom Luz Patient Status:  Inpatient    1945 MRN [id number]   Location [facility] 4W/SW/SE Attending Misael Newsome, [address] [facility] Day # 6 PCP Jacob Proctor MD       Subjective:   Rina Caldwell Application Topical PRN   Cholecalciferol (VITAMIN D) 1000 units tab 1,000 Units 1,000 Units Oral Daily   Doxycycline Monohydrate (MONODOX) cap 100 mg 100 mg Oral Daily   finasteride (PROSCAR) tab 5 mg 5 mg Oral Daily   folic acid (FOLVITE) tab 800 mcg 800 02/05/2017   * 02/04/2017   PLT 82* 02/03/2017       Recent Labs   Lab  02/03/17   0610  02/04/17   0529  02/05/17   0645   GLU  128*  91  112*   BUN  27*  25*  26*   CREATSERUM  0.92  0.89  0.68   GFRAA  >60  >60  >60   GFRNAA  >60  >60  >60   CA

## 2017-02-05 NOTE — CHRONIC PAIN
[facility] - [facility]  Anesthesiology Pain Management Progress Note      Patient name: Lisa Miranda 80year old male  : 1945  MRN: [id number]    Diagnosis: COPD exacerbation ([facility]  (primary encounter diagnosis)  Acute on chronic respira

## 2017-02-05 NOTE — PLAN OF CARE
Problem: Patient/Family Goals  Goal: Patient/Family Long Term Goal  Patient’s Long Term Goal: To maintain mobility    Interventions:  Evaluation by Physical medicine, out of bed TID per orders.   - See additional Care Plan goals for specific interventions indicated  - Manage/alleviate anxiety  - Monitor for signs/symptoms of CO2 retention   Outcome: Progressing  CPAP on overnight, O2 nc maintained to keep sats in low 90s. Comments:   Pt in better spirits overnight, reports pain improved.  CPAP on overnig

## 2017-02-06 NOTE — PLAN OF CARE
CARDIOVASCULAR - ADULT    • Absence of cardiac arrhythmias or at baseline Adequate for Discharge        DISCHARGE PLANNING    • Discharge to home or other facility with appropriate resources Adequate for Discharge        MUSCULOSKELETAL - ADULT    • Wolf Espinoza

## 2017-02-06 NOTE — OCCUPATIONAL THERAPY NOTE
OT treatment not completed. Pt is being discharged.      Lalit Shankar MA, OTR/L  Occupational Therapist

## 2017-02-06 NOTE — PLAN OF CARE
Problem: Patient/Family Goals  Goal: Patient/Family Long Term Goal  Patient’s Long Term Goal: To maintain mobility    Interventions:  Evaluation by Physical medicine, out of bed TID per orders.   - See additional Care Plan goals for specific interventions if the patient needs post-hospital services based on physician/LIP order or complex needs related to functional status, cognitive ability or social support system   Outcome: Progressing  Probable discharge today to [facility]    Problem: RESP

## 2017-02-06 NOTE — DISCHARGE PLANNING
The pt. Is scheduled to discharge to [facility] today 2/6 at 130p, via ambulance due to O2 need and max assist.  The pt's wife is aware and agreeable.       Report [address], [address] [facility]

## 2017-02-07 NOTE — PROGRESS NOTES
Faraz Bagley is a 80year old   male with a chronic history of obesity, sleep apnea, COPD and bipolar disorder who presented to the hospital with recent fall and COPD exacerbation.   The patient seen today for 35 minute, follow-up ana lilia floaters      OS   • CHF (congestive heart failure) (HCC)    • Depression    • Hearing impairment    • Muscle weakness    • Back problem    • Presence of vena cava filter           Past Surgical History    HERNIA SURGERY      Comment inguinal hernia repair (cpt=77003)    2/4/2017  CONCLUSION:  Fluoroscopic guidance as above. 6.4-seconds of fluoroscopy time were used. Zero (0) images are stored with this exam.      Xr Chest Ap Portable  (cpt=71010)    2/5/2017  CONCLUSION:  1.  No evidence of acute cardiopulm T4  Magnesium  CBC With Differential With Platelet  Sed Rate, Westergren (Automated)  Vitamin B12  Serum Protein Elect w/ reflex  Urinalysis, Routine Once  Valproic Acid, (Depakene)  Ammonia, Plasma  Ammonia, Plasma  Magnesium  Magnesium  Urinalysis with C

## 2017-02-11 NOTE — DISCHARGE SUMMARY
[facility] [facility] - [facility]    Discharge Summary    Singh Farrell Patient Status:  Inpatient    1945 MRN [id number]   Location [facility] 4W/SW/SE Attending No att. providers found   [address] Day # 7 PCP Gm Jacobson MD     Date of Admi symmetric, no curvature. ROM normal. No CVA tenderness.   Pulmonary:  clear to auscultation bilaterally  Cardiovascular: S1, S2 normal, no murmur, click, rub or gallop, regular rate and rhythm  Abdominal: soft, non-tender; bowel sounds normal; no masses,  n BUN 25, creatinine 0.95.  off ivfs      7.      Seizures.  Continue medications.  Depakote:  Will take brand name as wife says the generic and others do not work.  He brought in his own.       8.      Tremors.  Will continue Klonopin at his home dose.  Dec MG Tbec   Last time this was given:  81 mg on 2/2/2017  9:03 AM        Take 81 mg by mouth daily.     Refills:  0       carvedilol 6.25 MG Tabs   Last time this was given:  9.375 mg on 2/6/2017  9:25 AM   Commonly known as:  COREG        Take 9.375 mg by mo Stacy [place] [id number]    Hours:  24-hours Phone:  [phone]    - divalproex Sodium  MG Tb24  - nystatin [id number] UNIT/GM Crea      Please  your prescriptions at the location directed by your doctor or nurse     Bring a paper prescription for each of these

## 2017-02-20 NOTE — TELEPHONE ENCOUNTER
I spoke to Tracey Hu. She wanted to update how patient is doing. She stated [facility] [place]. States last Thursday and Friday he started having \"his manic depression episodes. \"  She states he is currently on Depakote ER 1

## 2017-03-26 PROBLEM — N17.9 ACUTE KIDNEY INJURY (HCC): Status: ACTIVE | Noted: 2017-01-01

## 2017-03-26 PROBLEM — R73.9 HYPERGLYCEMIA: Status: ACTIVE | Noted: 2017-01-01

## 2017-03-26 PROBLEM — I50.9 ACUTE ON CHRONIC CONGESTIVE HEART FAILURE (HCC): Status: ACTIVE | Noted: 2017-01-01

## 2017-03-26 PROBLEM — E87.5 HYPERKALEMIA: Status: ACTIVE | Noted: 2017-01-01

## 2017-03-26 PROBLEM — N17.9 ACUTE RENAL FAILURE (ARF) (HCC): Status: ACTIVE | Noted: 2017-01-01

## 2017-03-27 PROBLEM — I50.9 ACUTE ON CHRONIC CONGESTIVE HEART FAILURE, UNSPECIFIED CONGESTIVE HEART FAILURE TYPE: Status: ACTIVE | Noted: 2017-01-01

## 2017-03-27 PROBLEM — R60.0 BILATERAL EDEMA OF LOWER EXTREMITY: Status: ACTIVE | Noted: 2017-01-01

## 2017-03-27 PROBLEM — R09.02 HYPOXIA: Status: ACTIVE | Noted: 2017-01-01

## 2017-03-27 NOTE — HISTORICAL OFFICE NOTE
Bertram Davenport  : 1945  ACCOUNT:  [id number]  [phone]  PCP: Dr. Arnetta Duverney     TODAY'S DATE: 2016  DICTATED BY:  Patrick Sin MD, [facility], [address] [place] [address]: [Followup of .  Heart failure, systolic, Followup of 3rd Degree AV block c SIGNS: [B/P - 122/72 , Pulse - 72, Weight -  274, Height -   68 , BMI - 41.7 ]    CONS: in no distress and overweight. WEIGHT: BMI parameters reviewed and discussed. HEAD/FACE: normocephalic. EYES: sclera non-icteric.  ENT: mucosa pink and moist. NECK: JVD Cardiomyopathy, dilated, EF 40-45%  3. TIA  4. 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 Abnormal stress test  6. 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 Left bundle branch block (paced)  8.  Pacemaker dual nirav PHARMACOLOGICAL TEST: Unable to exercise to required levels    Comparison Study: None    FINDINGS: Projection images show no evidence of patient motion artifact. There is significant tissue attenuation as the imaging was done with the arms down.     Perfus DT:  05/12/2014 - Job ID: [id number] - C: Dr. Letty Posada

## 2017-03-27 NOTE — RESPIRATORY THERAPY NOTE
Noninvasive Ventilation:  Pt requires acute noninvasive ventilation: Yes  Mode: BiPAP 10/5  Tolerating: Well    Oxygen requirement:  Pt requires oxygen therapy: Yes  FiO2: 50%    NILSA/CPAP:   Pt indicated for nightly CPAP: Yes  Mode: BiPAP  Tolerating:  Well

## 2017-03-27 NOTE — ED INITIAL ASSESSMENT (HPI)
Pt received via EMS from [facility] with bilateral leg swelling since 1700 today. Hx of dementia, CHF, and COPD.

## 2017-03-27 NOTE — RESPIRATORY THERAPY NOTE
RT PATIENT TRANSPORT NOTE    Transported from: ED 25  Transported to:     Patient requiring bi-level support?: Yes  Bi-level support used during transport: No  Oxygen utilized during transport: Yes, non-rebreather at 15 L/M  Bi-level support returne

## 2017-03-27 NOTE — CONSULTS
[facility]    PATIENT'S NAME: Anthony Loco   ATTENDING PHYSICIAN: Ramona Leigh MD   CONSULTING PHYSICIAN: Toney De Leon MD   PATIENT ACCOUNT#:   [id number]    LOCATION:  [address] [place] [address] #:   [id number]       DATE OF BIR problems with BPH, dementia, and bipolar disorder. He is status post permanent pacemaker. There may be a history of some diastolic congestive heart failure.   He has had a history of bilateral deep vein thrombosis for which an inferior vena cava filter wa platelets were 13,677. Urinalysis shows 30 mg/dL protein but otherwise was unremarkable. BNP was 122.     Doppler venous study of bilateral lower extremities show extensive occlusive thrombus involving the entire deep vein system of the left lower extremi

## 2017-03-27 NOTE — H&P
[facility],Third Floor Patient Status:  Emergency    1945 MRN [id number]   Location [address] Attending Morenita De León MD   Hosp Day # 0 PCP Giovanny Baker MD     Chance SURGICAL HISTORY  2009    Comment prostate biopsy    LAPAROSCOPIC CHOLECYSTECTOMY      CHOLECYSTECTOMY      CARDIAC PACEMAKER PLACEMENT       Family History   Problem Relation Age of Onset   • Cancer Father    • Pulmonary Disease Father      emphysema   • clotrimazole-betamethasone (LOTRISONE) 1-0.05 % Apply Externally Cream   Yes No   Sig: Apply 1 Application topically as needed. finasteride (PROSCAR) 5 MG Oral Tab   Yes No   Sig: Take 5 mg by mouth daily.      folic acid 896 MCG Oral Tab   Yes No   S 5.5 03/26/2017   CL 95 03/26/2017   CO2 30 03/26/2017    03/26/2017   CA 9.3 03/26/2017   PTT 27.8 03/26/2017   INR 1.2 03/26/2017   TROP 0.07 03/26/2017       Imaging:  No exam resulted this encounter.     Assessment and Plan:    Acute hypoxic hyper

## 2017-03-27 NOTE — PROGRESS NOTES
03/27/17 1458   Clinical Encounter Type   Visited With Patient and family together   Routine Visit Introduction   Continue Visiting Yes   Referral From    Referral To Nurse   Patient Spiritual Encounters   Spiritual Assessment Completed 1 Spir

## 2017-03-27 NOTE — ED PROVIDER NOTES
Patient Seen in: [facility] AND [facility] Emergency Department    History   Patient presents with:  Swelling Edema (cardiovascular, metabolic)    Stated Complaint: Bilateral leg swelling     HPI    Patient presents the emergency department complaining of bilate aspirin 81 MG Oral Tab EC,  Take 81 mg by mouth daily. OLANZapine 2.5 MG Oral Tab,  Take 2.5 mg by mouth nightly. finasteride (PROSCAR) 5 MG Oral Tab,  Take 5 mg by mouth daily.      Doxycycline Hyclate (VIBRAMYCIN) 100 MG Oral Cap,  Take 100 mg by mo 2147 90 %   O2 Device 03/26/17 2147 Nasal cannula       Current:/72 mmHg  Pulse 88  Temp(Src) 98.8 °F (37.1 °C) (Axillary)  Resp 26  Ht 177.8 cm (5' 10\")  Wt 108.863 kg  BMI 34.44 kg/m2  SpO2 93%        Physical Exam   Constitutional: He is oriented for the following:     ABG pH 7.27 (*)     ABG pCO2 67 (*)     ABG PO2 138 (*)     ABG HCO3 29.7 (*)     Blood Gas P-50 31 (*)     All other components within normal limits   CBC W/ DIFFERENTIAL - Abnormal; Notable for the following:     WBC 12.6 (*)     R consultants  5 minutes interpreting labs and diagnostic testing          12:55 AM Dr. Balta Samayoa is at the bedside to examine the patient from cardiology.   Disposition and Plan     Clinical Impression:  Acute on chronic congestive heart failure, unspecified co

## 2017-03-27 NOTE — PROGRESS NOTES
[facility] - [facility]    Cardiology - AM-S  Progress Note    Trev Hu Patient Status:  Inpatient    1945 MRN [id number]   Location [facility] 2W/SW Attending Mili Jara MD   Hosp Day # 1 PCP Hiren Diehl MD

## 2017-03-27 NOTE — PLAN OF CARE
NEUROLOGICAL - ADULT    • Achieves stable or improved neurological status Not Progressing    • Achieves maximal functionality and self care Not Progressing    Pt lethargic, alert to self, hx of dementia. Follows commands, garbled speech.          CARDIOVASC

## 2017-03-27 NOTE — CONSULTS
[facility]  Report of Consultation    Kellen Cadet Patient Status:  Emergency    1945 MRN [id number]   Location [address] Attending Apolinar Delgado MD   Hosp Day # 1 PCP Raj Oconnor MD     Reason fo apnea    • Age-related nuclear cataract of both eyes 7/15/2014   • Drusen of both optic discs 7/15/2014   • Thrombocytopenia, unspecified ([facility] 7/3/2012   • Bronchitis    • TIA (transient ischemic attack)    • Encephalopathy    • Sleep apnea      CPAP   • Systems:  All systems were reviewed and are negative except as described above in HPI.   Manic depressive disorder, obstructive sleep apnea, history of pulmonary emboli, history of DVT, history of IVC filter, history of permanent pacemaker    Physical Exam: hypoxia (HCC)     Ataxia     Bipolar affective disorder in remission ([address]     Acute on chronic congestive heart failure (HCC)     Hyperkalemia     Hyperglycemia     Acute renal failure (ARF) ([address]     Acute kidney injury ([address]      Patient seen and examin

## 2017-03-27 NOTE — CM/SW NOTE
CTL met with patient's wife at bedside who requested emotional support and guidance. Mrs. 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   She is feeling overwhelmed with his multiple hospitalizations and being transferred in and out o

## 2017-03-27 NOTE — PLAN OF CARE
Patient/Family Goals    • Patient/Family Long Term Goal Not Progressing        Risk for Violence-Violent Restraints/Seclusion    • Patient will not express any violent or self-destructive behaviors Not Progressing

## 2017-03-27 NOTE — CONSULTS
[facility]    Consult Note    Date:  3/27/2017  Date of Admission:  3/26/2017      Chief Complaint:   Vikram Pal is a(n) 80year old male with increased lower extremity swelling and obtundation.     HPI:   The patient has a history filter          Past Surgical History    HERNIA SURGERY      Comment inguinal hernia repair    KNEE ARTHROSCOPY      OTHER SURGICAL HISTORY  2009    Comment prostate biopsy    LAPAROSCOPIC CHOLECYSTECTOMY      CHOLECYSTECTOMY      CARDIAC PACEMAKER PLACEME mouth daily. PEG 3350 Oral Powd Pack Take 17 g by mouth daily. Dextromethorphan-Quinidine (NUEDEXTA) 20-10 MG Oral Cap Take 1 capsule by mouth 2 (two) times daily. OLANZapine 20 MG Oral Tab Take 10 mg by mouth nightly.    Potassium Chloride ER 10 MEQ JVD nor bruit. Lungs diminished breath sounds with basilar crackle left greater than right to auscultation . Cardiac regular rate and rhythm no murmur. Abdomen nontender, without hepatosplenomegaly and no mass appreciable.    Extremities without clubbi STATUS–DNR    6. DVT prophylaxis–patient has IVC filter in place. With thrombocytopenia, will avoid chemoprophylaxis. Will wait on using SCDs until lower extremity venous ultrasound to rules out clot. Recommendations:  Will use SCDs after venous ultra

## 2017-03-27 NOTE — ED NOTES
Report called to [facility]. Pt transported on non re breather at 15L/min without change in status upon arrival to assigned rm 204.

## 2017-03-28 NOTE — PROGRESS NOTES
[facility]    Progress Note      Assessment and Plan:   1.  Acute on chronic respiratory failure–the patient has clinical syndrome of cardiorenal syndrome, obesity–hypoventilation and obstructive sleep apnea which may be decompensated. The and strength and reflex.      Results:     Lab Results  Component Value Date   WBC 8.5 03/28/2017   HGB 15.1 03/28/2017   HCT 45.6 03/28/2017   PLT 62 03/28/2017   CREATSERUM 1.96 03/28/2017    03/28/2017    03/28/2017   K 5.1 03/28/2017   CL 9

## 2017-03-28 NOTE — RESPIRATORY THERAPY NOTE
RT CPAP PROGRESS NOTE:    Patient is compliant with nightly CPAP:Yes  Patient refused: No  AutoCPAP in use: NO  Peak EPAP noted: 5  CPAP interface:Full face mask  Patient tolerating:Well    RCP recommends:  Pt is tollerating well with the mask and FIO2 60%

## 2017-03-28 NOTE — PROGRESS NOTES
[facility] Note:  Renal Adjustment for Levaquin (levofloxacin)    Alex Aguilar is a 80year old male who has been prescribed Levaquin (levofloxacin) 500 mg every 24 hrs. CrCl is estimated creatinine clearance is 36.2 mL/min (based on Cr of 1.96).

## 2017-03-28 NOTE — PLAN OF CARE
Patient received early this AM transferring from PCU/CCU. Report received from Clint White prior to transfer. Per report, elevated PTT from 3:30AM  was reported to the MD and Heprin drip to be stopped for 2 hours then restart/return per protocol.  No signs

## 2017-03-28 NOTE — SLP NOTE
ADULT SWALLOWING EVALUATION    ASSESSMENT & PLAN   ASSESSMENT    Bilabial seal functional. Lingual skills functional for bolus control and formation all trials. Bite reflex grossly functional in strength with solid.  Mastication grossly coordinated and requ nuclear cataract of both eyes 7/15/2014   • Drusen of both optic discs 7/15/2014   • Thrombocytopenia, unspecified ([facility] 7/3/2012   • Bronchitis    • TIA (transient ischemic attack)    • Encephalopathy    • Sleep apnea      CPAP   • Complete heart block (H Coordination: Intact  (Please note: Silent aspiration cannot be evaluated clinically.  Videofluoroscopic Swallow Study is required to rule-out silent aspiration.)    GOALS  Goal #1 The patient will tolerate solid consistency and thin (via tsp) liquids witho

## 2017-03-28 NOTE — PROGRESS NOTES
[facility]  Nephrology Daily Progress Note    Trev Hu  [id number]  80year old      HPI:   Trev Hu is a 80year old male. More alert and talking.  Still NPO.      ROS:     Constitutional:  Negative for decreased acti 03/28/2017   HCT 45.6 03/28/2017   PLT 62 03/28/2017   CREATSERUM 1.96 03/28/2017    03/28/2017    03/28/2017   K 5.1 03/28/2017   CL 99 03/28/2017   CO2 29 03/28/2017    03/28/2017   CA 8.3 03/28/2017   ALB 2.8 03/28/2017   ALKPHO 59 0 650 mg, Oral, Q6H PRN  •  ondansetron HCl (ZOFRAN) injection 4 mg, 4 mg, Intravenous, Q6H PRN  •  ipratropium-albuterol (DUONEB) nebulizer solution 3 mL, 3 mL, Nebulization, 6 times per day  •  MethylPREDNISolone Sodium Succ (Solu-MEDROL) injection 40 mg, q 12 hr rate. CXR and renal U/S noted. Discussed with wife.               3/28/2017  Bertram Galdamez MD

## 2017-03-28 NOTE — PROGRESS NOTES
[facility]    Progress Note    Doneta Seffner Patient Status:  Inpatient    1945 MRN [id number]   Location [facility] 3W/SW Attending Yanira Kincaid MD   Hosp Day # 2 PCP Cici Jeffries MD       Subjective:   Ck Fletcher opacities likely due to adjacent atelectasis. Xr Chest Ap Portable  (cpt=71010)    3/27/2017  CONCLUSION:  1. Little change from February 5, 2017. 2. Cardiomegaly. 3. Atherosclerosis. 4. Scarring/atelectasis. 5. Prominent markings.  6. Johnna Noble Quality:  · DVT Prophylaxis: heparin gtt   · CODE status: DNBETH Vasquez MD  03/28/2017

## 2017-03-29 NOTE — DIETARY NOTE
ADULT NUTRITION INITIAL ASSESSMENT    Pt is at moderate nutrition risk. Pt does not meet malnutrition criteria.       RECOMMENDATIONS TO MD:  Recommendations to MD: RD to manage oral nutritional supplements(ONS)     NUTRITION DIAGNOSIS/PROBLEM:  Increased HISTORY:   Past Medical History   Diagnosis Date   • Cataracts, bilateral 1998     OU   • Drusen (degenerative) of retina 1999     ONH superiorly OD and nasal OS; ONH Drusen OU 1999   • Floaters      OS   • Myopia with astigmatism      OU   • Unspecified e FINDINGS:  - Body Fat/Muscle Mass: well nourished and obese per visual exam.  - Fluid Accumulation: lower extremity edema +3 BLE edema reported. - Skin Integrity: at risk, breakdown and RN documentation reviewed.  Stage II wounds buttock and sacrum documen

## 2017-03-29 NOTE — PROGRESS NOTES
[facility]    Progress Note      Assessment and Plan:   1.  Acute on chronic respiratory failure–the patient has clinical syndrome of cardiorenal syndrome, obesity–hypoventilation and obstructive sleep apnea which may be decompensated. The nontender, without hepatosplenomegaly and no mass appreciable. Extremities without clubbing cyanosis nor edema. Neurologic grossly intact with symmetric tone and strength and reflex.      Results:       Lab Results  Component Value Date   WBC 11.5 03/29

## 2017-03-29 NOTE — PROGRESS NOTES
[facility] HOSP - [facility]    Cardiology - [facility]  Progress Note    Geneva Mims Patient Status:  Inpatient    1945 MRN [id number]   Location [facility] 3W/SW Attending Justin Webb MD   Hosp Day # 3 PCP Ruperto Pruitt MD       As 03/29/2017   HGB 14.3 03/29/2017   HCT 43.7 03/29/2017   PLT 78* 03/29/2017   CREATSERUM 1.59* 03/29/2017   * 03/29/2017    03/29/2017   K 4.8 03/29/2017    03/29/2017   CO2 29 03/29/2017   * 03/29/2017   CA 8.2* 03/29/2017   ALB

## 2017-03-29 NOTE — PROGRESS NOTES
[facility]  Nephrology Daily Progress Note    Redell Jeans  [id number]  80year old      HPI:   Redell Jeans is a 80year old male. Fairly alert. No new c/o but now requiring 12 O2. Tolerating diet.        ROS:     Constitutio appropriate for age reflexes and motor skills appropriate for age    Labs:    Lab Results  Component Value Date   WBC 11.5 03/29/2017   HGB 14.3 03/29/2017   HCT 43.7 03/29/2017   PLT 78 03/29/2017   CREATSERUM 1.59 03/29/2017    03/29/2017    •  Warfarin Sodium (COUMADIN) tab 10 mg, 10 mg, Oral, Nightly  •  divalproex Sodium (DEPAKOTE)  tab 250 mg, 250 mg, Oral, Nightly  •  divalproex Sodium ER (DEPAKOTE) 24 hr tab 500 mg, 500 mg, Oral, QAM  •  divalproex Sodium (DEPAKOTE)  tab 1,000 mg, ([facility] [address]     NILSA on CPAP     COPD exacerbation (HCC)     Hypernatremia     Azotemia     Metabolic alkalosis     Acute on chronic respiratory failure with hypoxia (HCC)     Ataxia     Bipolar affective disorder in remission ([facility] [address]     Acute on chronic [place]

## 2017-03-29 NOTE — SLP NOTE
SPEECH DAILY NOTE - INPATIENT    Evaluation Date: 03/29/2017    ASSESSMENT & PLAN   ASSESSMENT  Pt seen for swallowing therapy to monitor swallowing safety and train swallow precautions.   RN reports pt had increased difficulty at meals today with coughing seconds delayed with overt clinical signs of thin liquids via teaspoon. Trials of nectar thick liquids via teaspoon completed with overt clinical signs of aspiration with a consistent cough.   No overt clinical signs of aspiration on puree, solids, or eder

## 2017-03-29 NOTE — PROGRESS NOTES
[facility]    Progress Note    Magda Jay Patient Status:  Inpatient    1945 MRN [id number]   Location [facility] 3W/SW Attending Dorys Duran MD   Hosp Day # 3 PCP Daron Diaz MD       Subjective:   Davey Meyers 2. Small left pleural effusion. 3. Left retrocardiac opacities likely due to adjacent atelectasis. Xr Chest Ap Portable  (cpt=71010)    3/27/2017  CONCLUSION:  1. Little change from February 5, 2017. 2. Cardiomegaly. 3. Atherosclerosis.  4. Scarring when medically stable   - Cards following     KARL on CKD stage 3  - secondary to dehydration  - on IV fluids   - renal ultrasound reviewed    History of bipolar disorder/depression/anxiety  - on zyprexa and clonopin     Morbid obesity  - BMI 38  - counsell

## 2017-03-29 NOTE — PLAN OF CARE
CARDIOVASCULAR - ADULT    • Maintains optimal cardiac output and hemodynamic stability Progressing    • Absence of cardiac arrhythmias or at baseline Progressing        METABOLIC/FLUID AND ELECTROLYTES - ADULT    • Electrolytes maintained within normal momin

## 2017-03-30 PROBLEM — Z71.89 GOALS OF CARE, COUNSELING/DISCUSSION: Status: ACTIVE | Noted: 2017-01-01

## 2017-03-30 NOTE — PROGRESS NOTES
[place] [facility]    Progress Note    Stanlye Pellet Patient Status:  Inpatient    1945 MRN [id number]   Location [facility] 3W/SW Attending Ernestine Gómez Day # 4 PCP Julio Malik MD       Assessment and Pl Daily   • ClonazePAM  1 mg Oral TID   • ClonazePAM  0.5 mg Oral TID   • OLANZapine  10 mg Oral Nightly   • levofloxacin  750 mg Intravenous Q48H   • carvedilol  3.125 mg Oral BID with meals   • carvedilol  6.25 mg Oral BID with meals   • ipratropium-albute

## 2017-03-30 NOTE — PLAN OF CARE
RESPIRATORY - ADULT    • Achieves optimal ventilation and oxygenation Not Progressing    Pt is non-complaint with BIPAP& CPAP. On high flow NC @15 L. O2 sats 87%. No c/o SOB. MD aware pt is non-compliant.        CARDIOVASCULAR - ADULT    • Maintains optimal

## 2017-03-30 NOTE — PROGRESS NOTES
[facility]    Progress Note      Assessment and Plan:   1.  Acute on chronic respiratory failure–the patient has clinical syndrome of cardiorenal syndrome, obesity–hypoventilation and obstructive sleep apnea which may be decompensated. The bases to auscultation. Cardiac regular rate and rhythm no murmur. Abdomen nontender, without hepatosplenomegaly and no mass appreciable. Extremities without clubbing cyanosis 3+ lower extremity edema.    Neurologic with chronic mild dementia and genera

## 2017-03-30 NOTE — PLAN OF CARE
NEUROLOGICAL - ADULT    • Achieves maximal functionality and self care Not Progressing        RESPIRATORY - ADULT    • Achieves optimal ventilation and oxygenation Not Progressing    Pt on 15L NC high flow , oxygen saturation 88%    SKIN/TISSUE INTEGRITY -

## 2017-03-30 NOTE — HOSPICE RN NOTE
Hospice referral follow up    Writer met with patients wife for hospice informational visit. Patients wife is not ready for hospice at this time, she would like to continue with palliative care for now but will sign up if patient declines.   Wife is ok with

## 2017-03-30 NOTE — RESPIRATORY THERAPY NOTE
.Patient refused CPAP / BiPAP  therapy. [facility] has educated the patient on the purpose of and need for this therapy as well as potential negative outcomes associated with deferring treatment.  Despite education, patient maintains refusal. Recommended pt have f

## 2017-03-30 NOTE — PROGRESS NOTES
[place] [facility] - [facility]    Progress Note    Alex Aguilar Patient Status:  Inpatient    1945 MRN [id number]   Location [facility] 3W/SW Attending Ernestine Gómez Day # 4 PCP Roz Cano MD     Subjective:     H DVTs  - continue heparin gtt    - start coumadin 10 mg qhs     4. Elevated troponin  - cards on board    - start home dose Coreg 6.125 BID    - discontinue IV BB  - probable stress test when medically stable    - Cards following     5.  Acute renal failureI require TBD.         Haile Ritchie MD  3/30/2017

## 2017-03-30 NOTE — WOUND PROGRESS NOTE
WOUND CARE NOTE      PLAN   Recommendations:  Dietary consult for recommendations for nutrition to optimize wound healing  Turn schedules  Heels elevated using pillows, heel wedge or heel boots to offload heels  Use of lift equipment  To prevent sliding: 03/30/2017   ALT 15* 03/30/2017   MG 2.6* 03/30/2017   PHOS 2.8 03/30/2017

## 2017-03-30 NOTE — DISCHARGE PLANNING
3/30/17 CM Discharge planning / MDO hospice eval   Met with wife, discussed hospice services and philosophy, wife agreed to comfort care and hospice eval from [facility] and transfer to [facility]. Referral made to [facility] via [employer].

## 2017-03-30 NOTE — WOUND PROGRESS NOTE
New RN consult received. Pt is sitting up in bed eating lunch. Will attempt to re-see at a later time. RN is aware.

## 2017-03-30 NOTE — CONSULTS
[address] Patient Status:  Inpatient    1945 MRN [id number]   Location [facility] 3W/SW Attending Ernestine Gómez Day # 4 PCP Gm Jacobson MD     Chance episodes of coughing with congestion which create dyspnea. I discussed having low dose Morphine prn available if he develops any respiratory distress and wife is in agreement.  He denies any significant dyspnea at this time and no respiratory distress noted wife. I discussed the benefits of palliative care to include assistance with arising symptom management needs, extra layer of support, ensure GOC are respected throughout healthcare continuum and assist with transition to hospice care when appropriate.   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 syrup SYRP 500 mg, 500 mg, Oral, Daily  •  ClonazePAM (KLONOPIN) tab 1 mg, 1 mg, Oral, TID  •  ClonazePAM (KLONOPIN) tab 0.5 mg, 0.5 mg, Oral, TID  •  OLANZapine (ZYPREXA) tab 10 mg, 10 mg, Oral, Nightly  •  LevoFLOXacin in D5W (LEVAQUIN) IVPB premix 750 m infiltrates/subsegmental atelectasis. Objective:  Vital Signs:  Blood pressure 105/65, pulse 69, temperature 97.6 °F (36.4 °C), temperature source Oral, resp. rate 22, height 5' 8\" (1.727 m), weight 274 lb (124.286 kg), SpO2 88 %.   Body mass ind Morphine 2mg IVP Q 4 hrs prn any dyspnea/respiratory distress    COPD exacerbation (HCC)  -Per pulmonary    Acute on chronic congestive heart failure, unspecified congestive heart failure type ([address]  -EF 35-40%, followed by cardiology    Acute renal failur further, then we would transition to comfort care (inpt hospice care.) She would like to continue supportive care for now and  is agreeable to palliative care following.  I gave blank POLST form for review which I can complete with wife once decisions are m

## 2017-03-31 NOTE — PROGRESS NOTES
[place] [facility] - [facility]    Progress Note    Maurice Matta Patient Status:  Inpatient    1945 MRN [id number]   Location [facility] 4W/SW/SE Attending Landry Mo,*   Hosp Day # 5 PCP Chiquita Gabriel MD       Subjective: Grossly normal    Results:     Laboratory Data:    Lab Results  Component Value Date   WBC 10.7 03/30/2017   HGB 14.4 03/30/2017   HCT 44.1 03/30/2017   PLT 84* 03/30/2017   CREATSERUM 1.41 03/30/2017   * 03/30/2017    03/30/2017   K 4.9 03/30

## 2017-03-31 NOTE — PROGRESS NOTES
[facility]OS[facility] - [facility]    Cardiology - [facility]  Progress Note    Vikram Demarco Patient Status:  Inpatient    1945 MRN [id number]   Location [facility] 4W/SW/SE Attending Ernestine Gómez Day # 5 PCP Angel Isabel

## 2017-03-31 NOTE — RESPIRATORY THERAPY NOTE
RT CPAP PROGRESS NOTE:    Patient is compliant with nightly CPAP: YES  Patient refused: NO  AutoCPAP in use: NO  Peak EPAP noted: 10 cmH20  CPAP interface:NASAL MASK  Patient tolerating: WELL    RCP recommends:  CONTINUE TO MONITOR THE PT.

## 2017-03-31 NOTE — CONSULTS
[place] [facility] - [facility]  Palliative Care Follow Up    [facility] Patient Status:  Inpatient    1945 MRN [id number]   Location [facility] 4W/SW/SE Attending Ernestine Gómez Day # 5 PCP Sonia Alberts MD     Date o PRN  •  Warfarin Sodium (COUMADIN) tab 10 mg, 10 mg, Oral, Nightly  •  valproic Acid (DEPAKENE) syrup SYRP 1,250 mg, 1,250 mg, Oral, Nightly  •  valproic Acid (DEPAKENE) syrup SYRP 500 mg, 500 mg, Oral, Daily  •  ClonazePAM (KLONOPIN) tab 1 mg, 1 mg, Oral, Imaging:  Ct Chest (cpt=71250)    3/29/2017  CONCLUSION:  1. Small bilateral pleural effusions. 2. Bilateral lower lobe infiltrates/subsegmental atelectasis.            Objective:  Vital Signs:  Blood pressure 101/59, pulse 63, temperature 97.5 °F (36 Vasopressors    Assessment/Recommendations:  Acute hypoxemic respiratory failure  -Remains on [id number],  followed by pulmonary  -Has Morphine 2mg IVP Q 4 hrs prn available for any arising dyspnea/respiratory distress    COPD exacerbation (HCC)  -Per pulmona significant functional decline with dep 6/6 ADL's, increased sleeping and less interactive, unintentional weight loss of 15 pounds in the past 2 months, Albumin 2.6, frequent rehospitalizations=appears hospice appropriate    Palliative Care Follow Up:    A

## 2017-03-31 NOTE — PHYSICAL THERAPY NOTE
Spoke again with RN Dana Figueroa who reports patient is on 15L and SaO2 is 88% even at rest. Wife not present in room but RN reports pt will not tolerate any mobility. Will d/c PT order and RN aware to re-consult if PT or restorative care are needed.  Plan is t

## 2017-03-31 NOTE — CM/SW NOTE
CTL went to pt's room earlier this pm.  Pt is sleeping. 02 15L - 02 sats between 88 - 90%. PT and OT here to see pt earlier - both services have signed off d/t pt's condition at present. Pt was seen by Palliative Care services today.   Wife is not ready f

## 2017-03-31 NOTE — PROGRESS NOTES
[facility]     Progress Note        Kellen Cadet Patient Status:  Inpatient    1945 MRN [id number]   Location [facility] 4W/SW/SE Attending Ofe Castro,*   Hosp Day # 5 PCP MD Leonor Maynard influenza vaccine (PF)(FLUZONE) high dose for 65 yrs & older nj 0.5ml 0.5 mL Intramuscular Prior to discharge   heparin(PORCINE) 58660uievf/250mL infusion INITIAL DOSE 18 Units/kg/hr Intravenous Once   heparin (PORCINE) drip 13943ouenn/250mL infusion CON VASCULATURE: Thrombus cannot be excluded without intravenous contrast.  THORACIC AORTA: Normal size for age. No aneurysm. CHEST WALL: No axillary mass or enlarged adenopathy. Pacemaker projects in the left anterior chest wall.   LIMITED ABDOMEN: Status po medical renal failure. Correlate.      DICTATED BY (CST): Lara Fernandez MD ON 3/27/2017 AT 14:12     APPROVED BY (CST): EDITH BRUNSON MD ON 3/27/2017 AT 14:19          Us Venous Doppler Leg Bilat - Diag Img (szl=[id number])    3/27/2017  PROCEDURE: US VENOUS D position. Dictated by (CST): Leonor Espana MD on 3/28/2017 at 7:54     Approved by (CST): Leonor Espana MD on 3/28/2017 at 8:02          3/28/2017  CONCLUSION:  1. Low lung volumes which limits evaluation. 2. Small left pleural effusion.  3. Left retroca respiratory failure is likely multifactorial in nature. CT chest revealed atelectatic changes and small pleural effusion seen not significant in size to consider thoracentesis.   -Continue anticoagulation for DVT  -Intermittent BiPAP as need be and nightly

## 2017-03-31 NOTE — PROGRESS NOTES
[facility] Note:  Renal Adjustment for Levaquin (levofloxacin)    Alex Aguilar is a 80year old male who has been prescribed Levaquin (levofloxacin) 750 mg every 48 hrs. CrCl is estimated creatinine clearance is 51.6 mL/min (based on Cr of 1.29).

## 2017-03-31 NOTE — PROGRESS NOTES
[place] [facility] - [facility]    Progress Note    Micah Godinez Patient Status:  Inpatient    1945 MRN [id number]   Location [facility] 4W/SW/SE Attending John Gallagher,*   Hosp Day # 5 PCP Hector Redman MD     Subjective: heparin gtt    - start coumadin 10 mg qhs     4. Elevated troponin  - cards on board    - start home dose Coreg 6.125 BID    - discontinue IV BB  - probable stress test when medically stable    - Cards following     5.  Acute renal failureI on CKD stage 3  -

## 2017-03-31 NOTE — DISCHARGE PLANNING
3/31-MD orders received in regards to  community palliative care. Wife is not ready for hospice quite yet. Please set up community palliative care to follow to NH with a bridge to hospice care in the future pending clinical course.  [facility]

## 2017-03-31 NOTE — PROGRESS NOTES
[facility]    Progress Note    Magda Jay Patient Status:  Inpatient    1945 MRN [id number]   Location [facility] 4W/SW/SE Attending Katiana Seals,*   Hosp Day # 5 PCP Daron Diaz MD       Subjective: no abnormalities noted  Musculoskeletal: full ROM all extremities good strength  no deformities  Extremities: 3+ edema  Neurological:  Grossly normal    Results:     Laboratory Data:    Lab Results  Component Value Date   WBC 13.7* 03/31/2017   HGB 14.9 03

## 2017-03-31 NOTE — PHYSICAL THERAPY NOTE
Spoke with RN and [facility] from palliative who report patient is on 15L O2 and having some bleeding from his bottom. Wife is not yet present so will wait and speak with her regarding PT needs.

## 2017-03-31 NOTE — OCCUPATIONAL THERAPY NOTE
Spoke w/ nurse, Rosana Sauer, re: Pt status. Pt is on 15L of O2, is bedbound and does have saturations at 88% w/o acitivity. Nurse will confirm w/ wife, but states that he is not able to participate in therapy.   Pt is not appropriate for OT services at this ti

## 2017-03-31 NOTE — OCCUPATIONAL THERAPY NOTE
Attempted to see Pt for OT eval. Per nurse, Pt will return to NH w/ palliative care, he is bedbound and on 15L O2 at his baseline. Will confirm w/ wife that there are no OT needs.

## 2017-04-01 NOTE — RESPIRATORY THERAPY NOTE
Patient refused Nebulizer therapy. [facility] has educated the patient on the purpose of and need for this therapy as well as potential negative outcomes associated with deferring treatment.  Despite education, patient maintains refusal.

## 2017-04-01 NOTE — PROGRESS NOTES
[facility]    Progress Note      Assessment and Plan:   1.  Acute on chronic respiratory failure–the patient has clinical syndrome of cardiorenal syndrome, obesity–hypoventilation and obstructive sleep apnea which may be decompensated. The 04/01/2017   HCT 45.8 04/01/2017   PLT 98 04/01/2017   CREATSERUM 1.13 04/01/2017   BUN 90 04/01/2017    04/01/2017   K 4.9 04/01/2017    04/01/2017   CO2 32 04/01/2017    04/01/2017   CA 8.9 04/01/2017   .8 04/01/2017   INR 5.6 0

## 2017-04-01 NOTE — RESPIRATORY THERAPY NOTE
RT CPAP PROGRESS NOTE:    Patient is compliant with nightly CPAP: yes  Patient refused: NO  AutoCPAP in use: NO  Peak EPAP noted: [id number]  CPAP interface:NASAL MASK  Patient tolerating: WELL    RCP recommends:  CONTINUE TO MONITOR THE PT

## 2017-04-02 NOTE — PROGRESS NOTES
[facility]    Progress Note      Assessment and Plan:   1.  Acute on chronic respiratory failure–the patient has clinical syndrome of cardiorenal syndrome, obesity–hypoventilation and obstructive sleep apnea which may be decompensated. The Results:       Lab Results  Component Value Date   WBC 16.6 04/02/2017   HGB 14.8 04/02/2017   HCT 45.4 04/02/2017   PLT 91 04/02/2017   CREATSERUM 1.22 04/02/2017   BUN 93 04/02/2017    04/02/2017   K 5.0 04/02/2017    04/02/2017   CO2 27 04

## 2017-04-02 NOTE — PROGRESS NOTES
[facility]    Progress Note    Trev Hu Patient Status:  Inpatient    1945 MRN [id number]   Location [facility] 4W/SW/SE Attending Jamshid Billings MD   Hosp Day # 6 PCP Hiren Diehl MD       Subjective:   Marni Haywood probable stress test when medically stable    - Cards following     5. Acute renal failureI on CKD stage 3  - secondary to dehydration  - on IV fluids    - renal ultrasound reviewed    6. History of bipolar disorder/depression/anxiety  - on zyprexa and clon

## 2017-04-02 NOTE — HOSPICE RN NOTE
Hospice referral follow up. Patient continues to receive supportive measures, is currently on heparin gtt. Tentative plan is to discharge patient to [facility] SNF when oxygen is at 5-6L/min, he is currently at 10L.   I spoke with patients wife who state

## 2017-04-02 NOTE — RESPIRATORY THERAPY NOTE
RT CPAP PROGRESS NOTE:    Patient is compliant with nightly CPAP: YES  Patient refused: NO  AutoCPAP in use:NO  [facility]ak EPAP noted: 10 cmH20  CPAP interface:NASAL MASK  Patient tolerating:WELL    RCP recommends: PATIENT IS TOLERATING CPAP SETTINGS WELL.

## 2017-04-02 NOTE — PROGRESS NOTES
[place] [facility] - [facility]    Progress Note    Maurice Matta Patient Status:  Inpatient    1945 MRN [id number]   Location [facility] 4W/SW/SE Attending Jordan Rico MD   Hosp Day # 7 PCP Chiquita Gabriel MD       Subjective:   Ca troponin, trace  ECHO decr EF~35% with severe hypokinesis of the anterior and apical myocardium  - cards on board    - start home dose Coreg 6.125 BID    - discontinue IV BB  - probable stress test when medically stable    - Cards following     5.  Acute re

## 2017-04-03 PROBLEM — J96.90 RESPIRATORY FAILURE (HCC): Status: ACTIVE | Noted: 2017-01-01

## 2017-04-03 NOTE — PROGRESS NOTES
[facility]    Progress Note      Assessment and Plan:   1.  Acute on chronic respiratory failure– the patient is struggling to breathe this morning. Family and patient now requests to transition in hospice.     Recommendations: Morphine dri Director, [facility]  Pager: [phone]

## 2017-04-03 NOTE — H&P
[facility],Third Floor Patient Status:  Inpatient    1945 MRN [id number]   Location [facility] 4W/SW/SE Attending Rufina Troncoso MD   Hosp Day # 0 PCP Giovany Matias MD     Date:   HERNIA SURGERY      Comment inguinal hernia repair    KNEE ARTHROSCOPY      OTHER SURGICAL HISTORY  2009    Comment prostate biopsy    LAPAROSCOPIC CHOLECYSTECTOMY      CHOLECYSTECTOMY      CARDIAC PACEMAKER PLACEMENT       Family History   Problem Relatio left basal pulmonary vasculature is not atelectasis or pneumonia/infiltrate. [facility] provided a preliminary report for this examination. This final report agrees with their preliminary findings.             Assessment/Plan:   Hospice Care  Continu

## 2017-04-03 NOTE — DISCHARGE SUMMARY
[place] [facility] - [facility]    Discharge Summary    Alida Aamir Patient Status:  Inpatient    1945 MRN [id number]   Location [facility] 4W/SW/SE Attending No att. 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

## 2017-04-03 NOTE — PROGRESS NOTES
04/03/17 1621   Clinical Encounter Type   Visited With Family   Routine Visit Introduction   Continue Visiting Yes   Family Spiritual Encounters   Family Coping Sadness; Anxiety   Family Participation in Care 5   Family Support During Treatment 5     Alma

## 2017-04-03 NOTE — CONSULTS
[facility] - [facility]  Palliative Care Follow Up    [facility] Patient Status:  Inpatient    1945 MRN [id number]   Location [facility] 4W/SW/SE Attending Ernestine Gómez # 8 PCP Lilli De Leon MD     Date o reaction(s): Unknown  Thorazine  [Chlorpr*        Medications:     Current facility-administered medications:   •  Piperacillin Sod-Tazobactam So (ZOSYN) 4.5 g in dextrose 5 % 100 mL IVPB, 4.5 g, Intravenous, Q8H  •  valproic acid (DEPAKENE) cap 500 mg, 50 PHOS 2.8 03/30/2017   TROP 0.07* 03/26/2017       Imaging:  Xr Chest Ap Portable  (cpt=71010)    4/3/2017  CONCLUSION:  Small left basal pulmonary vasculature is not atelectasis or pneumonia/infiltrate.   [facility] provided a preliminary report for declined Spiritual Care    Disposition: inpatient hospice     Comfort care only    Hospitalization:  Prefers no rehospitalization    Procedures:  No intubation  No g-tube  No hemodialysis  No ICU transfer  [id number] Lucila Duron for bipap prn    Medications:  No Vasopressors was spent counseling and coordinating care. Discussed today's visit with Dr. Roxanna Meraz, Dr. Yuriy Villanueva RN from [facility]    I will continue to follow until transition to hospice care.       FELA Berkowitz, [facility] [id number]  4/3/2017  12

## 2017-04-03 NOTE — PROGRESS NOTES
[facility] Pharmacy Note: Antimicrobial Weight Dose Adjustment for: Zosyn (piperacillin/tazobactam)    Yesy Boo is a 80year old male who has been prescribed Zosyn (piperacillin/tazobactam) 3.375 ivpb q8hrs.   CrCl is estimated creatinine clearance is 5

## 2017-04-03 NOTE — DISCHARGE PLANNING
MD order received regarding hospice eval.  [facility]. [facility] will follow back up with the pt and his wife.       [phone]

## 2017-04-04 NOTE — PLAN OF CARE
PAIN - ADULT    • Verbalizes/displays adequate comfort level or patient's stated pain goal Not Progressing        Patient/Family Goals    • Patient/Family Long Term Goal Not Progressing    • Patient/Family Short Term Goal Not Progressing            Patient

## 2017-04-04 NOTE — PROGRESS NOTES
04/04/17 0614   Clinical Encounter Type   Visited With Family   Routine Visit Introduction   Continue Visiting Yes   Crisis Visit Death   Family Spiritual Encounters   Family Coping Open/discussion; Sadness   Family Participation in Care 5   Family Suppo

## 2017-04-04 NOTE — PLAN OF CARE
PAIN - ADULT    • Verbalizes/displays adequate comfort level or patient's stated pain goal Progressing          Pt rested comfortably through night. Morphine drip increased to 3mg/hr. High flow nasal cannula maintained. Velasquez in place. Repositoned.  Pt pass

## 2017-04-04 NOTE — SIGNIFICANT EVENT
Pt  at [address]. Resusitation not attempted as pt was DNR.     Time of Death 18    Family Notified Mohsen Alvarengaeron, wife  MD Dr. Rosalba Ruiz of [facility] [place] [facility] Notified Macy Anne #[phone]     contacted if applicable N/A

## 2017-04-25 NOTE — DISCHARGE SUMMARY
[facility] - [facility]    Discharge Summary    Mariola Hearn Patient Status:  Inpatient    1945 MRN [id number]   Location [facility] 4W/SW/SE Attending No att. providers found   [facility] Day # 8 PCP Giovany Matias MD     Date of Admi was made DNR/DNI. Due to worsening respiratory status family decided to pursue comfort care. Hospital Course: Pt  in in-patient hospice. Discharge Condition:     Maday Wood.  Moriah Ibrahim  2017  3:54 PM

## (undated) DEVICE — SWABSTICK PVP IOD 4IN PDI PRP

## (undated) DEVICE — STERILE POLYISOPRENE POWDER-FREE SURGICAL GLOVES: Brand: PROTEXIS

## (undated) DEVICE — OMNIPAQUE 240ML VIAL

## (undated) DEVICE — NEEDLE SPINAL 22X3-1/2 BLK

## (undated) DEVICE — PEN: MARKING STD PT 100/CS: Brand: MEDICAL ACTION INDUSTRIES

## (undated) DEVICE — STANDARD HYPODERMIC NEEDLE,POLYPROPYLENE HUB: Brand: MONOJECT

## (undated) DEVICE — 3M™ STERI-STRIP™ REINFORCED ADHESIVE SKIN CLOSURES, R1547, 1/2 IN X 4 IN (12 MM X 100 MM), 6 STRIPS/ENVELOPE: Brand: 3M™ STERI-STRIP™

## (undated) DEVICE — 60 ML SYRINGE LUER-LOCK TIP: Brand: MONOJECT

## (undated) DEVICE — COVER SGL STRL LGHT HNDL BLU

## (undated) DEVICE — GAUZE SPONGES,12 PLY: Brand: CURITY

## (undated) DEVICE — TOWEL OR BLU 16X26 STRL

## (undated) DEVICE — BANDAID COVERLET 1X3

## (undated) DEVICE — 12 ML SYRINGE LUER-LOCK TIP: Brand: MONOJECT

## (undated) DEVICE — DRAPE C-ARM UNIVERSAL

## (undated) NOTE — IP AVS SNAPSHOT
Patient Demographics     Address Phone    [facility]  [address] [facility] [phone] (Home) *Preferred*  [phone] (Work)      Emergency Contact(s)     Name Relation Home Work Gilbert [phone]  [address] [place] [address] Take 2 tablets (1,000 mg total) by mouth nightly. Stop taking on:  3/8/2017    Maryln Denver                           Doxycycline Hyclate 100 MG Caps   Commonly known as:  VIBRAMYCIN        Take 100 mg by mouth daily.                             pauly - divalproex Sodium  MG Tb24  - nystatin [id number] UNIT/GM Crea      Please  your prescriptions at the location directed by your doctor or nurse     Bring a paper prescription for each of these medications    - ClonazePAM 2 MG Tabs            44 CPAP Settings (Inpatient)       Most Recent Value    Mode CPAP    Interface -- [PT USING OWN MASK AND TUBING]    Set EPAP 5    Set CPAP 9    O2% 24 %    Flow rate 1 lpm         Lab Results Last 24 Hours (02/06/17 - 02/06/17)      CBC WITH DIFFERENTIAL WITH The accuracy of the MDRD equation is not suitable for acute renal failure patients and it is not recommended for use with pregnant women.             Testing Performed By     Russ Durant Name Director Address Valid Date Range    [address] [facility], [address] had back pain that caused him to not be able to lie in bed, and he was trying to get into a recliner where he was more comfortable, and it was in trying to get into that recliner that he had to use a chair lift, and he fell out of that.      PAST MEDICAL HI chest pain, but he does report being short of breath with exertion. He has some back pains. He has no nausea or vomiting. No unusual rashes. No unusual new joint pains except for the back. He urinates frequently.   Otherwise, 13 systems reviewed are ne 3. Morbid obesity with body mass index of 42.58 with obstructive sleep apnea. 4.   Bipolar disorder. Will ask Dr. Rocio Hopkins to follow as the patient will probably need some form of rehab and will need clearance. 5.   Code status is full code.   6.   Allen History of Present Illness:   Patient is a 80year old   male with a chronic history of obesity, sleep apnea, COPD and bipolar disorder who presented to the hospital with recent fall and COPD exacerbation.   Psych consult requested to prepar Otherwise need to coordinate treatment to work on replacing Depakote cautiously with neurologist.        Medical History:       Past Medical History  Past Medical History   Diagnosis Date   • Cataracts, bilateral 1998     OU   • Drusen (degenerative) of re Alcohol Use: No                    Current Medications:    Current Facility-Administered Medications:  ClonazePAM (KLONOPIN) tab 2.5 mg 2.5 mg Oral BID   divalproex Sodium ER (DEPAKOTE) 24 hr tab 500 mg 500 mg Oral Nightly   lactulose (CHRONULAC) 10 GM/1 bisacodyl (DULCOLAX) rectal suppository 10 mg 10 mg Rectal Daily PRN       Prescriptions prior to admission:  folic acid 789 MCG Oral Tab Take 800 mcg by mouth daily. aspirin 81 MG Oral Tab EC Take 81 mg by mouth daily.    OLANZapine 2.5 MG Oral Tab Take ESRML 6 01/31/2017   MG 1.6* 01/31/2017   TROP 0.01 01/30/2017   B12 517 01/31/2017         Imaging:  Ct Brain Or Head ([id number])    1/30/2017  CONCLUSION:  1. No significant change since previous study. 2. Diffuse cerebral and cerebellar atrophy.  3. Chronic 2. Psychotherapy focusing on insight orientation and coordinate treatment with teams and spouse. 3.  Recommend continuation of Depakote ER 1500 mg nightly until discussed with the spouse about alternative mood stabilizer. 4.  Continue Zyprexa 2.5 mg p. o. Ataxia    Bipolar affective disorder in remission [facility])      Past Medical History  Past Medical History   Diagnosis Date   • Cataracts, bilateral 1998     OU   • Drusen (degenerative) of retina 1999     ONH superiorly OD and nasal OS; Abdiel [address] 75 Drusen OU 1999 Dynamic Standing: Not tested    ACTIVITY TOLERANCE  Poor.  On   Diagnosis  Current Medical Diagnoses:     Age-related nuclear cataract of both eyes     Drusen of both optic discs     Thrombocytopenia, unspecified (HCC)     NILSA on CPAP     COPD exacerbation • Cataracts, bilateral 1998     OU   • Drusen (degenerative) of retina 1999     ONH superiorly OD and nasal OS; ONH Drusen OU 1999   • Floaters      OS   • Myopia with astigmatism      OU   • Unspecified essential hypertension    • Platelets decreased ([address] Skilled Therapy Provided: Bed mobility training,ROm exercises    THERAPEUTIC EXERCISES  Lower Extremity Gentle AROM as tolerated     Upper Extremity      Position supine     Repetitions   10   Sets   1     Patient End of Session: In bed;RN aware of session for monitoring of diet tolerance. Swallowing precautions/strategies discussed and demonstrated with Pt and spouse. Pt given trials of thin liquids for possible upgrade. CXR 2/01/17 no acute findings.         Diet Recommendations - Solids: Regular  Diet Re triggered 1-2 sec delayed. No overt clinical signs of aspiration on any swallows (no coughing, no throat clearing and clear vocal quality). Rec upgrade to thin liquids. RN to get orders.      GOAL PROGRESSING        FOLLOW UP  Follow Up Needed: Yes  CAMELIA Curiel

## (undated) NOTE — LETTER
Hospital Discharge Documentation  Please phone to schedule a hospital follow up appointment.     From: [address] Hospitalist's Office  Phone: [phone]    Patient discharged time/date: 2/6/2017  2:06 PM  Patient discharge disposition:  Sarah Thurston ondansetron HCl (ZOFRAN) injection 4 mg  4 mg  Intravenous  Q4H PRN    PEG 3350 (MIRALAX) powder packet 17 g  17 g  Oral  Daily PRN    Zolpidem Tartrate (AMBIEN) tab 2.5 mg  2.5 mg  Oral  Nightly PRN    HYDROcodone-acetaminophen (NORCO) 5-325 MG per tab 1 B12  517  01/31/2017        Xr Or Fluoro Guide For Spine Tx/dx Injection (cpt=77003)    2/4/2017  CONCLUSION:          Fluoroscopic guidance as above.  6.4-seconds of fluoroscopy time were used.  Zero (0) images are stored with this exam.      Xr Chest Ap P 8.      Tremors.  Will continue Klonopin at his home dose.  Decrease klonopin given lethargy - improved with decrease in dose      9.      Viral encephalitis, neuropathy back in 1992, 1993.       10.    Acute encephalopathy inc ammonia - better with lactul

## (undated) NOTE — IP AVS SNAPSHOT
[address]  [address] [facility] [phone]                Discharge Summary   1/30/2017    [address]           Admission Information        Provider Department    1/30/2017 Cherri Ruth DO [facility]                              divalproex Sodium  MG Tb24   Last time this was given:  1,000 mg on 2/5/2017  8:21 PM   Commonly known as:  DEPAKOTE   What changed:  how much to take        Take 2 tablets (1,000 mg total) by mouth mitzy Vitamin D 1000 units Caps        Take 1 capsule by mouth daily. vitamin E 400 UNITS Caps   Last time this was given:  400 Units on 2/6/2017  9:25 AM        Take 400 Units by mouth daily.                             Zinc 50 MG Tab Neutrophil % Lymphocyte % Monocyte % Eosinophil % Basophil % Prelim Neut Abs Final Neut Abs Lymphocyte Abso Monocyte Absolu Eosinophil Abso Basophil Absolu    (02/06/17)  83 (02/06/17)  8 (02/06/17)  6 (02/06/17)  0 (02/06/17)  0 -- (02/06/17)  8.1 (H) (0 coverage. Patient [address] is a Federal Navigator program that can help with your Affordable Care Act coverage, as well as all types of Medicaid plans.   To get signed up and covered, please call [phone] and ask to get set up for an insuran What to report to your healthcare team: Tolerance of medications, temperature, rash, itching, shortness of breath, chills, nausea, and diarrhea           Blood Pressure and Cardiac Medications     carvedilol (COREG) 6.25 MG Oral Tab         Use: Treat abno Use:  Treat conditions such as seizures, headaches, depression, anxiety and other neurologic conditions   Most common side effects:  Dizziness, drowsiness, headache, nausea/vomiting, somnolence   What to report to your healthcare team: Dizziness, Somnolen

## (undated) NOTE — LETTER
Hospital Discharge Documentation  From: [address] Hospitalist's Office  Phone: [phone]    Patient discharged time/date: 2017  4:45 AM  Patient discharge disposition:   Hospice  Discharge summary not available at this time, last progres •  BPH (benign prostatic hyperplasia)      •  Other ill-defined conditions          \"hernia\"    •  Dermatochalasis          OU    •  Meibomian gland dysfunction          OU    •  Vitreous floaters          OS    •  CHF (congestive heart failure) ([facility])    HCT  45.4  04/02/2017    PLT  91*  04/02/2017    CREATSERUM  1.22  04/02/2017    BUN  93*  04/02/2017    NA  141  04/02/2017    K  5.0  04/02/2017    CL  104  04/02/2017    CO2  27  04/02/2017    GLU  128*  04/02/2017    CA  8.9  04/02/2017    ALB  2.6*  0